# Patient Record
Sex: FEMALE | Race: WHITE | Employment: OTHER | URBAN - METROPOLITAN AREA
[De-identification: names, ages, dates, MRNs, and addresses within clinical notes are randomized per-mention and may not be internally consistent; named-entity substitution may affect disease eponyms.]

---

## 2018-08-13 ENCOUNTER — HOSPITAL ENCOUNTER (OUTPATIENT)
Dept: MAMMOGRAPHY | Age: 78
Discharge: HOME | End: 2018-08-13
Attending: NURSE PRACTITIONER
Payer: MEDICARE

## 2018-08-13 DIAGNOSIS — M81.0 AGE-RELATED OSTEOPOROSIS WITHOUT CURRENT PATHOLOGICAL FRACTURE: ICD-10-CM

## 2018-08-13 PROCEDURE — 77063 BREAST TOMOSYNTHESIS BI: CPT

## 2018-08-13 PROCEDURE — 77080 DXA BONE DENSITY AXIAL: CPT

## 2018-09-10 ENCOUNTER — APPOINTMENT (RX ONLY)
Dept: URBAN - METROPOLITAN AREA CLINIC 24 | Facility: CLINIC | Age: 78
Setting detail: DERMATOLOGY
End: 2018-09-10

## 2018-09-10 DIAGNOSIS — D18.0 HEMANGIOMA: ICD-10-CM

## 2018-09-10 DIAGNOSIS — L82.1 OTHER SEBORRHEIC KERATOSIS: ICD-10-CM

## 2018-09-10 DIAGNOSIS — D22 MELANOCYTIC NEVI: ICD-10-CM

## 2018-09-10 DIAGNOSIS — L21.8 OTHER SEBORRHEIC DERMATITIS: ICD-10-CM

## 2018-09-10 PROBLEM — D18.01 HEMANGIOMA OF SKIN AND SUBCUTANEOUS TISSUE: Status: ACTIVE | Noted: 2018-09-10

## 2018-09-10 PROBLEM — D22.39 MELANOCYTIC NEVI OF OTHER PARTS OF FACE: Status: ACTIVE | Noted: 2018-09-10

## 2018-09-10 PROBLEM — D22.71 MELANOCYTIC NEVI OF RIGHT LOWER LIMB, INCLUDING HIP: Status: ACTIVE | Noted: 2018-09-10

## 2018-09-10 PROBLEM — D23.72 OTHER BENIGN NEOPLASM OF SKIN OF LEFT LOWER LIMB, INCLUDING HIP: Status: ACTIVE | Noted: 2018-09-10

## 2018-09-10 PROCEDURE — ? COUNSELING

## 2018-09-10 PROCEDURE — 99202 OFFICE O/P NEW SF 15 MIN: CPT

## 2018-09-10 ASSESSMENT — LOCATION ZONE DERM
LOCATION ZONE: EAR
LOCATION ZONE: FACE
LOCATION ZONE: ARM
LOCATION ZONE: NECK
LOCATION ZONE: NOSE
LOCATION ZONE: LEG
LOCATION ZONE: TRUNK

## 2018-09-10 ASSESSMENT — LOCATION SIMPLE DESCRIPTION DERM
LOCATION SIMPLE: LEFT FOREARM
LOCATION SIMPLE: LEFT ANTERIOR NECK
LOCATION SIMPLE: NOSE
LOCATION SIMPLE: LEFT THIGH
LOCATION SIMPLE: LEFT CHEEK
LOCATION SIMPLE: LEFT EAR
LOCATION SIMPLE: RIGHT UPPER BACK
LOCATION SIMPLE: RIGHT KNEE
LOCATION SIMPLE: LEFT LOWER BACK
LOCATION SIMPLE: ABDOMEN

## 2018-09-10 ASSESSMENT — LOCATION DETAILED DESCRIPTION DERM
LOCATION DETAILED: LEFT DISTAL DORSAL FOREARM
LOCATION DETAILED: LEFT INFERIOR LATERAL MIDBACK
LOCATION DETAILED: RIGHT SUPERIOR UPPER BACK
LOCATION DETAILED: SUBXIPHOID
LOCATION DETAILED: LEFT NASAL DORSUM
LOCATION DETAILED: LEFT SUPERIOR MEDIAL MALAR CHEEK
LOCATION DETAILED: RIGHT KNEE
LOCATION DETAILED: LEFT INFERIOR LATERAL NECK
LOCATION DETAILED: RIGHT RIB CAGE
LOCATION DETAILED: LEFT ANTERIOR DISTAL THIGH
LOCATION DETAILED: LEFT PROXIMAL DORSAL FOREARM
LOCATION DETAILED: RIGHT MID-UPPER BACK
LOCATION DETAILED: LEFT LATERAL MANDIBULAR CHEEK
LOCATION DETAILED: LEFT CAVUM CONCHA

## 2019-01-09 ENCOUNTER — HOSPITAL ENCOUNTER (OUTPATIENT)
Age: 79
Setting detail: OBSERVATION
Discharge: NURSING, REHAB OR LONG-TERM CARE | DRG: 482 | End: 2019-01-14
Attending: EMERGENCY MEDICINE | Admitting: INTERNAL MEDICINE
Payer: MEDICARE

## 2019-01-09 ENCOUNTER — APPOINTMENT (OUTPATIENT)
Dept: GENERAL RADIOLOGY | Age: 79
DRG: 482 | End: 2019-01-09
Attending: EMERGENCY MEDICINE
Payer: MEDICARE

## 2019-01-09 DIAGNOSIS — S72.001A CLOSED FRACTURE OF RIGHT HIP, INITIAL ENCOUNTER (HCC): Primary | ICD-10-CM

## 2019-01-09 PROBLEM — S72.009A HIP FRACTURE (HCC): Status: ACTIVE | Noted: 2019-01-09

## 2019-01-09 LAB
ALBUMIN SERPL-MCNC: 3.2 G/DL (ref 3.2–4.6)
ALBUMIN/GLOB SERPL: 0.9 {RATIO} (ref 1.2–3.5)
ALP SERPL-CCNC: 96 U/L (ref 50–136)
ALT SERPL-CCNC: 26 U/L (ref 12–65)
ANION GAP SERPL CALC-SCNC: 4 MMOL/L (ref 7–16)
APTT PPP: 27.4 SEC (ref 24.7–39.8)
AST SERPL-CCNC: 20 U/L (ref 15–37)
BASOPHILS # BLD: 0.1 K/UL (ref 0–0.2)
BASOPHILS NFR BLD: 0 % (ref 0–2)
BILIRUB SERPL-MCNC: 0.3 MG/DL (ref 0.2–1.1)
BUN SERPL-MCNC: 22 MG/DL (ref 8–23)
CALCIUM SERPL-MCNC: 9.1 MG/DL (ref 8.3–10.4)
CHLORIDE SERPL-SCNC: 106 MMOL/L (ref 98–107)
CO2 SERPL-SCNC: 30 MMOL/L (ref 21–32)
CREAT SERPL-MCNC: 0.83 MG/DL (ref 0.6–1)
DIFFERENTIAL METHOD BLD: ABNORMAL
EOSINOPHIL # BLD: 0.1 K/UL (ref 0–0.8)
EOSINOPHIL NFR BLD: 1 % (ref 0.5–7.8)
ERYTHROCYTE [DISTWIDTH] IN BLOOD BY AUTOMATED COUNT: 14.2 % (ref 11.9–14.6)
GLOBULIN SER CALC-MCNC: 3.7 G/DL (ref 2.3–3.5)
GLUCOSE SERPL-MCNC: 124 MG/DL (ref 65–100)
HCT VFR BLD AUTO: 39 % (ref 35.8–46.3)
HGB BLD-MCNC: 12.5 G/DL (ref 11.7–15.4)
IMM GRANULOCYTES # BLD AUTO: 0.1 K/UL (ref 0–0.5)
IMM GRANULOCYTES NFR BLD AUTO: 1 % (ref 0–5)
INR PPP: 1
LYMPHOCYTES # BLD: 2.8 K/UL (ref 0.5–4.6)
LYMPHOCYTES NFR BLD: 18 % (ref 13–44)
MCH RBC QN AUTO: 29.3 PG (ref 26.1–32.9)
MCHC RBC AUTO-ENTMCNC: 32.1 G/DL (ref 31.4–35)
MCV RBC AUTO: 91.5 FL (ref 79.6–97.8)
MONOCYTES # BLD: 0.8 K/UL (ref 0.1–1.3)
MONOCYTES NFR BLD: 5 % (ref 4–12)
NEUTS SEG # BLD: 11.5 K/UL (ref 1.7–8.2)
NEUTS SEG NFR BLD: 75 % (ref 43–78)
NRBC # BLD: 0 K/UL (ref 0–0.2)
PLATELET # BLD AUTO: 253 K/UL (ref 150–450)
PMV BLD AUTO: 10.5 FL (ref 9.4–12.3)
POTASSIUM SERPL-SCNC: 4.1 MMOL/L (ref 3.5–5.1)
PROT SERPL-MCNC: 6.9 G/DL (ref 6.3–8.2)
PROTHROMBIN TIME: 13.5 SEC (ref 11.7–14.5)
RBC # BLD AUTO: 4.26 M/UL (ref 4.05–5.2)
SODIUM SERPL-SCNC: 140 MMOL/L (ref 136–145)
WBC # BLD AUTO: 15.5 K/UL (ref 4.3–11.1)

## 2019-01-09 PROCEDURE — 77030005518 HC CATH URETH FOL 2W BARD -B

## 2019-01-09 PROCEDURE — 73552 X-RAY EXAM OF FEMUR 2/>: CPT

## 2019-01-09 PROCEDURE — 0T9B70Z DRAINAGE OF BLADDER WITH DRAINAGE DEVICE, VIA NATURAL OR ARTIFICIAL OPENING: ICD-10-PCS | Performed by: INTERNAL MEDICINE

## 2019-01-09 PROCEDURE — 73502 X-RAY EXAM HIP UNI 2-3 VIEWS: CPT

## 2019-01-09 PROCEDURE — 83970 ASSAY OF PARATHORMONE: CPT

## 2019-01-09 PROCEDURE — 85610 PROTHROMBIN TIME: CPT

## 2019-01-09 PROCEDURE — 51702 INSERT TEMP BLADDER CATH: CPT | Performed by: EMERGENCY MEDICINE

## 2019-01-09 PROCEDURE — 82306 VITAMIN D 25 HYDROXY: CPT

## 2019-01-09 PROCEDURE — 86900 BLOOD TYPING SEROLOGIC ABO: CPT

## 2019-01-09 PROCEDURE — 71045 X-RAY EXAM CHEST 1 VIEW: CPT

## 2019-01-09 PROCEDURE — 93005 ELECTROCARDIOGRAM TRACING: CPT | Performed by: EMERGENCY MEDICINE

## 2019-01-09 PROCEDURE — 0QS606Z REPOSITION RIGHT UPPER FEMUR WITH INTRAMEDULLARY INTERNAL FIXATION DEVICE, OPEN APPROACH: ICD-10-PCS | Performed by: ORTHOPAEDIC SURGERY

## 2019-01-09 PROCEDURE — 99285 EMERGENCY DEPT VISIT HI MDM: CPT | Performed by: EMERGENCY MEDICINE

## 2019-01-09 PROCEDURE — 80053 COMPREHEN METABOLIC PANEL: CPT

## 2019-01-09 PROCEDURE — 85730 THROMBOPLASTIN TIME PARTIAL: CPT

## 2019-01-09 PROCEDURE — 85025 COMPLETE CBC W/AUTO DIFF WBC: CPT

## 2019-01-09 PROCEDURE — 84134 ASSAY OF PREALBUMIN: CPT

## 2019-01-09 RX ORDER — MORPHINE SULFATE 2 MG/ML
4 INJECTION, SOLUTION INTRAMUSCULAR; INTRAVENOUS ONCE
Status: DISCONTINUED | OUTPATIENT
Start: 2019-01-09 | End: 2019-01-09

## 2019-01-10 ENCOUNTER — APPOINTMENT (OUTPATIENT)
Dept: GENERAL RADIOLOGY | Age: 79
DRG: 482 | End: 2019-01-10
Attending: ORTHOPAEDIC SURGERY
Payer: MEDICARE

## 2019-01-10 ENCOUNTER — ANESTHESIA (OUTPATIENT)
Dept: SURGERY | Age: 79
DRG: 482 | End: 2019-01-10
Payer: MEDICARE

## 2019-01-10 ENCOUNTER — ANESTHESIA EVENT (OUTPATIENT)
Dept: SURGERY | Age: 79
DRG: 482 | End: 2019-01-10
Payer: MEDICARE

## 2019-01-10 LAB
ABO + RH BLD: NORMAL
APPEARANCE UR: CLEAR
ATRIAL RATE: 86 BPM
BACTERIA SPEC CULT: NORMAL
BILIRUB UR QL: NEGATIVE
BLOOD GROUP ANTIBODIES SERPL: NORMAL
CALCIUM SERPL-MCNC: 9 MG/DL (ref 8.3–10.4)
CALCULATED P AXIS, ECG09: 67 DEGREES
CALCULATED R AXIS, ECG10: 19 DEGREES
CALCULATED T AXIS, ECG11: 80 DEGREES
COLOR UR: YELLOW
DIAGNOSIS, 93000: NORMAL
GLUCOSE UR STRIP.AUTO-MCNC: NEGATIVE MG/DL
HGB UR QL STRIP: NEGATIVE
KETONES UR QL STRIP.AUTO: 15 MG/DL
LEUKOCYTE ESTERASE UR QL STRIP.AUTO: NEGATIVE
NITRITE UR QL STRIP.AUTO: NEGATIVE
P-R INTERVAL, ECG05: 182 MS
PH UR STRIP: 6.5 [PH] (ref 5–9)
PREALB SERPL-MCNC: 23.1 MG/DL (ref 18–35.7)
PROT UR STRIP-MCNC: NEGATIVE MG/DL
PTH-INTACT SERPL-MCNC: 59.2 PG/ML (ref 18.5–88)
Q-T INTERVAL, ECG07: 358 MS
QRS DURATION, ECG06: 78 MS
QTC CALCULATION (BEZET), ECG08: 428 MS
SERVICE CMNT-IMP: NORMAL
SP GR UR REFRACTOMETRY: 1.02 (ref 1–1.02)
SPECIMEN EXP DATE BLD: NORMAL
UROBILINOGEN UR QL STRIP.AUTO: 0.2 EU/DL (ref 0.2–1)
VENTRICULAR RATE, ECG03: 86 BPM

## 2019-01-10 PROCEDURE — 97161 PT EVAL LOW COMPLEX 20 MIN: CPT

## 2019-01-10 PROCEDURE — 99218 HC RM OBSERVATION: CPT

## 2019-01-10 PROCEDURE — 87641 MR-STAPH DNA AMP PROBE: CPT

## 2019-01-10 PROCEDURE — 74011250637 HC RX REV CODE- 250/637: Performed by: NURSE PRACTITIONER

## 2019-01-10 PROCEDURE — 77030002933 HC SUT MCRYL J&J -A: Performed by: ORTHOPAEDIC SURGERY

## 2019-01-10 PROCEDURE — 36415 COLL VENOUS BLD VENIPUNCTURE: CPT

## 2019-01-10 PROCEDURE — 76010000161 HC OR TIME 1 TO 1.5 HR INTENSV-TIER 1: Performed by: ORTHOPAEDIC SURGERY

## 2019-01-10 PROCEDURE — 77030014405 HC GD ROD RMR SYNT -C: Performed by: ORTHOPAEDIC SURGERY

## 2019-01-10 PROCEDURE — 77030021907 HC KT URIN FOL O&M -A

## 2019-01-10 PROCEDURE — 74011250636 HC RX REV CODE- 250/636: Performed by: ANESTHESIOLOGY

## 2019-01-10 PROCEDURE — 74011250637 HC RX REV CODE- 250/637: Performed by: INTERNAL MEDICINE

## 2019-01-10 PROCEDURE — 74011250636 HC RX REV CODE- 250/636

## 2019-01-10 PROCEDURE — 74011250636 HC RX REV CODE- 250/636: Performed by: ORTHOPAEDIC SURGERY

## 2019-01-10 PROCEDURE — 74011250636 HC RX REV CODE- 250/636: Performed by: INTERNAL MEDICINE

## 2019-01-10 PROCEDURE — 77030020255 HC SOL INJ LR 1000ML BG

## 2019-01-10 PROCEDURE — 76060000033 HC ANESTHESIA 1 TO 1.5 HR: Performed by: ORTHOPAEDIC SURGERY

## 2019-01-10 PROCEDURE — 81003 URINALYSIS AUTO W/O SCOPE: CPT

## 2019-01-10 PROCEDURE — 74011250636 HC RX REV CODE- 250/636: Performed by: NURSE PRACTITIONER

## 2019-01-10 PROCEDURE — 73552 X-RAY EXAM OF FEMUR 2/>: CPT

## 2019-01-10 PROCEDURE — 77030008467 HC STPLR SKN COVD -B: Performed by: ORTHOPAEDIC SURGERY

## 2019-01-10 PROCEDURE — C1713 ANCHOR/SCREW BN/BN,TIS/BN: HCPCS | Performed by: ORTHOPAEDIC SURGERY

## 2019-01-10 PROCEDURE — 77030020263 HC SOL INJ SOD CL0.9% LFCR 1000ML

## 2019-01-10 PROCEDURE — C1769 GUIDE WIRE: HCPCS | Performed by: ORTHOPAEDIC SURGERY

## 2019-01-10 PROCEDURE — 97530 THERAPEUTIC ACTIVITIES: CPT

## 2019-01-10 PROCEDURE — 77030018836 HC SOL IRR NACL ICUM -A: Performed by: ORTHOPAEDIC SURGERY

## 2019-01-10 PROCEDURE — 77030027138 HC INCENT SPIROMETER -A

## 2019-01-10 PROCEDURE — 74011000302 HC RX REV CODE- 302: Performed by: INTERNAL MEDICINE

## 2019-01-10 PROCEDURE — 65270000029 HC RM PRIVATE

## 2019-01-10 PROCEDURE — 76210000006 HC OR PH I REC 0.5 TO 1 HR: Performed by: ORTHOPAEDIC SURGERY

## 2019-01-10 PROCEDURE — 74011000250 HC RX REV CODE- 250

## 2019-01-10 PROCEDURE — 77030019940 HC BLNKT HYPOTHRM STRY -B: Performed by: ANESTHESIOLOGY

## 2019-01-10 PROCEDURE — 74011000258 HC RX REV CODE- 258: Performed by: NURSE PRACTITIONER

## 2019-01-10 PROCEDURE — 77030007880 HC KT SPN EPDRL BBMI -B: Performed by: ANESTHESIOLOGY

## 2019-01-10 PROCEDURE — 77030003665 HC NDL SPN BBMI -A: Performed by: ANESTHESIOLOGY

## 2019-01-10 PROCEDURE — 86580 TB INTRADERMAL TEST: CPT | Performed by: INTERNAL MEDICINE

## 2019-01-10 DEVICE — IMPLANTABLE DEVICE: Type: IMPLANTABLE DEVICE | Site: HIP | Status: FUNCTIONAL

## 2019-01-10 RX ORDER — OXYCODONE HYDROCHLORIDE 5 MG/1
5 TABLET ORAL
Status: DISCONTINUED | OUTPATIENT
Start: 2019-01-10 | End: 2019-01-14 | Stop reason: HOSPADM

## 2019-01-10 RX ORDER — ENOXAPARIN SODIUM 100 MG/ML
30 INJECTION SUBCUTANEOUS EVERY 24 HOURS
Status: DISCONTINUED | OUTPATIENT
Start: 2019-01-11 | End: 2019-01-10

## 2019-01-10 RX ORDER — OXYCODONE AND ACETAMINOPHEN 10; 325 MG/1; MG/1
1 TABLET ORAL AS NEEDED
Status: DISCONTINUED | OUTPATIENT
Start: 2019-01-10 | End: 2019-01-10 | Stop reason: HOSPADM

## 2019-01-10 RX ORDER — BUPIVACAINE HYDROCHLORIDE 7.5 MG/ML
INJECTION, SOLUTION INTRASPINAL
Status: COMPLETED | OUTPATIENT
Start: 2019-01-10 | End: 2019-01-10

## 2019-01-10 RX ORDER — SODIUM CHLORIDE 0.9 % (FLUSH) 0.9 %
5-40 SYRINGE (ML) INJECTION AS NEEDED
Status: DISCONTINUED | OUTPATIENT
Start: 2019-01-10 | End: 2019-01-10 | Stop reason: HOSPADM

## 2019-01-10 RX ORDER — ACETAMINOPHEN 325 MG/1
650 TABLET ORAL EVERY 8 HOURS
Status: DISCONTINUED | OUTPATIENT
Start: 2019-01-10 | End: 2019-01-10 | Stop reason: HOSPADM

## 2019-01-10 RX ORDER — TRAMADOL HYDROCHLORIDE 50 MG/1
50 TABLET ORAL
Status: DISCONTINUED | OUTPATIENT
Start: 2019-01-10 | End: 2019-01-14 | Stop reason: HOSPADM

## 2019-01-10 RX ORDER — HYDROMORPHONE HYDROCHLORIDE 2 MG/ML
0.5 INJECTION, SOLUTION INTRAMUSCULAR; INTRAVENOUS; SUBCUTANEOUS
Status: DISCONTINUED | OUTPATIENT
Start: 2019-01-10 | End: 2019-01-10 | Stop reason: HOSPADM

## 2019-01-10 RX ORDER — SODIUM CHLORIDE 0.9 % (FLUSH) 0.9 %
5-40 SYRINGE (ML) INJECTION EVERY 8 HOURS
Status: DISCONTINUED | OUTPATIENT
Start: 2019-01-10 | End: 2019-01-10 | Stop reason: HOSPADM

## 2019-01-10 RX ORDER — OXYCODONE HYDROCHLORIDE 5 MG/1
5 TABLET ORAL
Status: DISCONTINUED | OUTPATIENT
Start: 2019-01-10 | End: 2019-01-10 | Stop reason: HOSPADM

## 2019-01-10 RX ORDER — BUPIVACAINE HYDROCHLORIDE 7.5 MG/ML
INJECTION, SOLUTION INTRASPINAL AS NEEDED
Status: DISCONTINUED | OUTPATIENT
Start: 2019-01-10 | End: 2019-01-10 | Stop reason: HOSPADM

## 2019-01-10 RX ORDER — SODIUM CHLORIDE 9 MG/ML
75 INJECTION, SOLUTION INTRAVENOUS CONTINUOUS
Status: DISCONTINUED | OUTPATIENT
Start: 2019-01-10 | End: 2019-01-10 | Stop reason: HOSPADM

## 2019-01-10 RX ORDER — PROPOFOL 10 MG/ML
INJECTION, EMULSION INTRAVENOUS
Status: DISCONTINUED | OUTPATIENT
Start: 2019-01-10 | End: 2019-01-10 | Stop reason: HOSPADM

## 2019-01-10 RX ORDER — PROPOFOL 10 MG/ML
INJECTION, EMULSION INTRAVENOUS AS NEEDED
Status: DISCONTINUED | OUTPATIENT
Start: 2019-01-10 | End: 2019-01-10 | Stop reason: HOSPADM

## 2019-01-10 RX ORDER — CEFAZOLIN SODIUM/WATER 2 G/20 ML
2 SYRINGE (ML) INTRAVENOUS
Status: COMPLETED | OUTPATIENT
Start: 2019-01-10 | End: 2019-01-10

## 2019-01-10 RX ORDER — HYDROCODONE BITARTRATE AND ACETAMINOPHEN 7.5; 325 MG/1; MG/1
1 TABLET ORAL
Status: DISCONTINUED | OUTPATIENT
Start: 2019-01-10 | End: 2019-01-10

## 2019-01-10 RX ORDER — SODIUM CHLORIDE, SODIUM LACTATE, POTASSIUM CHLORIDE, CALCIUM CHLORIDE 600; 310; 30; 20 MG/100ML; MG/100ML; MG/100ML; MG/100ML
75 INJECTION, SOLUTION INTRAVENOUS CONTINUOUS
Status: DISCONTINUED | OUTPATIENT
Start: 2019-01-10 | End: 2019-01-10 | Stop reason: HOSPADM

## 2019-01-10 RX ORDER — MAG HYDROX/ALUMINUM HYD/SIMETH 200-200-20
30 SUSPENSION, ORAL (FINAL DOSE FORM) ORAL
Status: DISCONTINUED | OUTPATIENT
Start: 2019-01-10 | End: 2019-01-14 | Stop reason: HOSPADM

## 2019-01-10 RX ORDER — DOCUSATE SODIUM 100 MG/1
100 CAPSULE, LIQUID FILLED ORAL 2 TIMES DAILY
Status: DISCONTINUED | OUTPATIENT
Start: 2019-01-10 | End: 2019-01-14 | Stop reason: HOSPADM

## 2019-01-10 RX ORDER — SODIUM CHLORIDE 0.9 % (FLUSH) 0.9 %
5-40 SYRINGE (ML) INJECTION EVERY 8 HOURS
Status: DISCONTINUED | OUTPATIENT
Start: 2019-01-10 | End: 2019-01-14 | Stop reason: HOSPADM

## 2019-01-10 RX ORDER — FERROUS SULFATE, DRIED 160(50) MG
1 TABLET, EXTENDED RELEASE ORAL
Status: DISCONTINUED | OUTPATIENT
Start: 2019-01-10 | End: 2019-01-14 | Stop reason: HOSPADM

## 2019-01-10 RX ORDER — SODIUM CHLORIDE, SODIUM LACTATE, POTASSIUM CHLORIDE, CALCIUM CHLORIDE 600; 310; 30; 20 MG/100ML; MG/100ML; MG/100ML; MG/100ML
75 INJECTION, SOLUTION INTRAVENOUS CONTINUOUS
Status: DISCONTINUED | OUTPATIENT
Start: 2019-01-10 | End: 2019-01-14 | Stop reason: HOSPADM

## 2019-01-10 RX ORDER — ACETAMINOPHEN 325 MG/1
650 TABLET ORAL EVERY 8 HOURS
Status: DISCONTINUED | OUTPATIENT
Start: 2019-01-10 | End: 2019-01-14 | Stop reason: HOSPADM

## 2019-01-10 RX ORDER — SODIUM CHLORIDE 0.9 % (FLUSH) 0.9 %
5-40 SYRINGE (ML) INJECTION AS NEEDED
Status: DISCONTINUED | OUTPATIENT
Start: 2019-01-10 | End: 2019-01-14 | Stop reason: HOSPADM

## 2019-01-10 RX ORDER — ENOXAPARIN SODIUM 100 MG/ML
40 INJECTION SUBCUTANEOUS EVERY 24 HOURS
Status: DISCONTINUED | OUTPATIENT
Start: 2019-01-11 | End: 2019-01-14 | Stop reason: HOSPADM

## 2019-01-10 RX ORDER — HYDROMORPHONE HYDROCHLORIDE 1 MG/ML
0.5 INJECTION, SOLUTION INTRAMUSCULAR; INTRAVENOUS; SUBCUTANEOUS
Status: DISCONTINUED | OUTPATIENT
Start: 2019-01-10 | End: 2019-01-10 | Stop reason: HOSPADM

## 2019-01-10 RX ORDER — ONDANSETRON 2 MG/ML
4 INJECTION INTRAMUSCULAR; INTRAVENOUS
Status: DISCONTINUED | OUTPATIENT
Start: 2019-01-10 | End: 2019-01-14 | Stop reason: HOSPADM

## 2019-01-10 RX ORDER — OXYCODONE HYDROCHLORIDE 5 MG/1
5 TABLET ORAL
Status: DISCONTINUED | OUTPATIENT
Start: 2019-01-10 | End: 2019-01-10

## 2019-01-10 RX ADMIN — TUBERCULIN PURIFIED PROTEIN DERIVATIVE 5 UNITS: 5 INJECTION INTRADERMAL at 01:28

## 2019-01-10 RX ADMIN — Medication 10 ML: at 20:41

## 2019-01-10 RX ADMIN — DOCUSATE SODIUM 100 MG: 100 CAPSULE, LIQUID FILLED ORAL at 19:16

## 2019-01-10 RX ADMIN — OXYCODONE HYDROCHLORIDE 5 MG: 5 TABLET ORAL at 15:21

## 2019-01-10 RX ADMIN — ACETAMINOPHEN 650 MG: 325 TABLET ORAL at 20:40

## 2019-01-10 RX ADMIN — SODIUM CHLORIDE 1000 MG: 900 INJECTION, SOLUTION INTRAVENOUS at 20:41

## 2019-01-10 RX ADMIN — Medication 10 ML: at 05:48

## 2019-01-10 RX ADMIN — SODIUM CHLORIDE, SODIUM LACTATE, POTASSIUM CHLORIDE, AND CALCIUM CHLORIDE 75 ML/HR: 600; 310; 30; 20 INJECTION, SOLUTION INTRAVENOUS at 11:27

## 2019-01-10 RX ADMIN — ACETAMINOPHEN 650 MG: 325 TABLET ORAL at 05:48

## 2019-01-10 RX ADMIN — SODIUM CHLORIDE 250 ML: 900 INJECTION, SOLUTION INTRAVENOUS at 05:49

## 2019-01-10 RX ADMIN — SODIUM CHLORIDE, SODIUM LACTATE, POTASSIUM CHLORIDE, AND CALCIUM CHLORIDE 75 ML/HR: 600; 310; 30; 20 INJECTION, SOLUTION INTRAVENOUS at 15:21

## 2019-01-10 RX ADMIN — Medication 5 ML: at 15:21

## 2019-01-10 RX ADMIN — PROPOFOL 50 MG: 10 INJECTION, EMULSION INTRAVENOUS at 12:52

## 2019-01-10 RX ADMIN — PROPOFOL 50 MCG/KG/MIN: 10 INJECTION, EMULSION INTRAVENOUS at 12:54

## 2019-01-10 RX ADMIN — OXYCODONE HYDROCHLORIDE 5 MG: 5 TABLET ORAL at 20:40

## 2019-01-10 RX ADMIN — HYDROCODONE BITARTRATE AND ACETAMINOPHEN 1 TABLET: 7.5; 325 TABLET ORAL at 02:00

## 2019-01-10 RX ADMIN — SODIUM CHLORIDE 250 ML: 900 INJECTION, SOLUTION INTRAVENOUS at 01:30

## 2019-01-10 RX ADMIN — BUPIVACAINE HYDROCHLORIDE 1 ML: 7.5 INJECTION, SOLUTION INTRASPINAL at 12:53

## 2019-01-10 RX ADMIN — CALCIUM CARBONATE 500 MG (1,250 MG)-VITAMIN D3 200 UNIT TABLET 1 TABLET: at 15:19

## 2019-01-10 RX ADMIN — ACETAMINOPHEN 650 MG: 325 TABLET ORAL at 02:00

## 2019-01-10 RX ADMIN — ACETAMINOPHEN 650 MG: 325 TABLET ORAL at 15:18

## 2019-01-10 RX ADMIN — BUPIVACAINE HYDROCHLORIDE 7.5 MG: 7.5 INJECTION, SOLUTION INTRASPINAL at 12:55

## 2019-01-10 RX ADMIN — Medication 5 ML: at 02:00

## 2019-01-10 RX ADMIN — Medication 2 G: at 13:05

## 2019-01-10 NOTE — ED TRIAGE NOTES
Patient arrives via EMS from [facility]. Ems states that she tripped and fell. EMS states no head injury or LOC. Patient complaining of right hip pain. EMS placed patient in traction splint and gave 10mg morphine.

## 2019-01-10 NOTE — ANESTHESIA PREPROCEDURE EVALUATION
Anesthetic History No history of anesthetic complications Review of Systems / Medical History Patient summary reviewed and pertinent labs reviewed Pulmonary Within defined limits Neuro/Psych Within defined limits Cardiovascular Exercise tolerance: >4 METS 
  
GI/Hepatic/Renal 
Within defined limits Endo/Other Within defined limits Other Findings Physical Exam 
 
Airway Mallampati: II 
TM Distance: > 6 cm Neck ROM: normal range of motion Mouth opening: Normal 
 
 Cardiovascular Rhythm: regular Dental 
No notable dental hx Pulmonary Abdominal 
GI exam deferred Other Findings Anesthetic Plan ASA: 2 Anesthesia type: spinal 
 
 
 
 
Induction: Intravenous Anesthetic plan and risks discussed with: Patient

## 2019-01-10 NOTE — ED PROVIDER NOTES
68-year-old female presenting for right hip pain. She tripped and fell at [employer]. She was unable to get up. She is lost her balance walking on uneven pavement and fell on her right side. She does have a history of mild osteoporosis treated with calcium. She denies back or neck pain. She did not hit her head or pass out. She's had no chest pain or palpitations. The history is provided by the patient. Fall The accident occurred less than 1 hour ago. The fall occurred while walking. She fell from a height of 1 - 2 ft. She landed on hard floor. There was no blood loss. The point of impact was the right hip. The pain is present in the right hip. The pain is at a severity of 6/10. The pain is moderate. She was not ambulatory at the scene. There was no entrapment after the fall. There was no drug use involved in the accident. There was no alcohol use involved in the accident. Pertinent negatives include no visual change, no fever, no numbness, no abdominal pain, no bowel incontinence, no nausea, no vomiting, no hematuria, no headaches, no extremity weakness, no hearing loss, no loss of consciousness, no tingling and no laceration. The risk factors include none. The symptoms are aggravated by use of injured limb. Prehospitalization: traction splint. She has tried immobilization for the symptoms. The treatment provided moderate relief. No past medical history on file. No past surgical history on file. No family history on file. Social History Socioeconomic History  Marital status:  Spouse name: Not on file  Number of children: Not on file  Years of education: Not on file  Highest education level: Not on file Social Needs  Financial resource strain: Not on file  Food insecurity - worry: Not on file  Food insecurity - inability: Not on file  Transportation needs - medical: Not on file  Transportation needs - non-medical: Not on file Occupational History  Not on file Tobacco Use  Smoking status: Not on file Substance and Sexual Activity  Alcohol use: Not on file  Drug use: Not on file  Sexual activity: Not on file Other Topics Concern  Not on file Social History Narrative  Not on file ALLERGIES: Patient has no known allergies. Review of Systems Constitutional: Negative for fever. Gastrointestinal: Negative for abdominal pain, bowel incontinence, nausea and vomiting. Genitourinary: Negative for hematuria. Musculoskeletal: Negative for extremity weakness. Neurological: Negative for tingling, loss of consciousness, numbness and headaches. All other systems reviewed and are negative. Vitals:  
 01/09/19 2023 BP: 188/84 Pulse: 86 Resp: 14 Temp: 99 °F (37.2 °C) SpO2: 91% Weight: 77.1 kg (170 lb) Height: 5' 5\" (1.651 m) Physical Exam  
Constitutional: She is oriented to person, place, and time. She appears well-developed and well-nourished. HENT:  
Head: Normocephalic and atraumatic. Eyes: Conjunctivae are normal. Pupils are equal, round, and reactive to light. Neck: Neck supple. Cardiovascular: Normal rate and regular rhythm. Pulmonary/Chest: Effort normal and breath sounds normal.  
Abdominal: Soft. Bowel sounds are normal.  
Musculoskeletal:  
Tenderness to palpation over the right greater trochanter, leg is in a traction splint, he does appear somewhat shorter than the left, pulses are 2+ bilaterally in the color of the foot is normal  
Neurological: She is alert and oriented to person, place, and time. Skin: Skin is warm and dry. No laceration noted. Nursing note and vitals reviewed. MDM Number of Diagnoses or Management Options Closed fracture of right hip, initial encounter [facility]):  
Diagnosis management comments: 75-year-old female presenting for right hip pain after fall. Concern for hip fracture. Amount and/or Complexity of Data Reviewed Clinical lab tests: ordered and reviewed (No significant abnormality) Tests in the radiology section of CPT®: ordered and reviewed Tests in the medicine section of CPT®: ordered and reviewed Discuss the patient with other providers: yes (Consulted the orthopedist and the hospitalist who will assume care of this patient) Independent visualization of images, tracings, or specimens: yes (I reviewed the patient's chest x-ray and hip x-rays) Risk of Complications, Morbidity, and/or Mortality Presenting problems: moderate Management options: high Patient Progress Patient progress: improved ED Course as of Jan 09 2202 Wed Jan 09, 2019 2059 Consulting Dr. Justine Charles PA for hip fracture management  [JS] 2103 I reviewed the patient's hip x-ray and she appears to have a right-sided intratrochanteric hip fracture. [JS] 2111 Room 734  [JS]  
2128 Preoperative EKG was performed and shows a normal sinus rhythm rate 86, normal axis, DC is 182, QRS 78, QTC is 428 with no acute ischemic changes  [JS] ED Course User Index [JS] Catalino Headley MD  
 
 
Procedures

## 2019-01-10 NOTE — PROGRESS NOTES
TRANSFER - IN REPORT: 
 
Verbal report received from Rojelio Thomas RN on [employer]  being received from ER for routine progression of care Report consisted of patients Situation, Background, Assessment and  
Recommendations(SBAR). Information from the following report(s) SBAR and ED Summary was reviewed with the receiving nurse. Opportunity for questions and clarification was provided. Assessment completed upon patients arrival to unit and care assumed.

## 2019-01-10 NOTE — PROGRESS NOTES
Spiritual Care Visit. Initial visit. Patient was visited by [place]o Cuevas. Entered by Zachariah Anne, Staff .  Cindi, Vani.

## 2019-01-10 NOTE — PROGRESS NOTES
TRANSFER - IN REPORT: 
 
Verbal report received from [place], Rn(name) on [address]  being received from PACU(unit) for routine progression of care Report consisted of patients Situation, Background, Assessment and  
Recommendations(SBAR). Information from the following report(s) SBAR was reviewed with the receiving nurse. Opportunity for questions and clarification was provided. Assessment completed upon patients arrival to unit and care assumed.

## 2019-01-10 NOTE — OP NOTES
[facility] REPORT    Rosalio Laura  MR#: [id number]  : 1940  ACCOUNT #: [de-identified]   DATE OF SERVICE: 01/10/2019    PREOPERATIVE DIAGNOSIS:  Right intertrochanteric hip fracture. POSTOPERATIVE DIAGNOSIS:  Right intertrochanteric hip fracture. PROCEDURE PERFORMED:  ORIF right intertrochanteric hip fracture with IM nail. SURGEON:  Tracy Antunez MD    ASSISTANT:  NONE    ANESTHESIA:  Spinal.    SPECIMENS REMOVED:  NONE    IMPLANTS:  SEE BRIEF OP NOTE    PROCEDURE:  The patient was brought to the operative suite, placed in supine position. After adequate anesthesia was achieved in the form of spinal, the patient was placed upon the fracture table. Perineal posts and foot holes were well padded. Right hip underwent reduction with longitudinal traction, adduction and internal rotation. The hip was then prepped and draped in the usual sterile fashion. A 3 cm incision was made over the tip of the greater trochanter. Hemostasis achieved with Bovie cautery. Guidewire for a Synthes trochanteric fixation nail advance was inserted through the tip of the trochanter across the fracture site into the canal of the femur. Its position was confirmed by AP and lateral fluoroscopic images. The proximal reamer was then passed by hand over this guidewire to open up the proximal portal.  These were removed. Then, a ball tip guidewire was inserted through this portal down to the epiphyseal scar of the knee. Depth gauge was used to determine the appropriate length nail, 11 mm reamer was passed by hand, acted as a sound. The 11 was a little tight, so we reamed the 11, 11.5 and 12 over the guidewire and then chose a Synthes trochanteric fixation nail advanced 420 mm x 10 mm x 130 degree right. It was passed over the ball-tipped guidewire without difficulty. Ball tip guidewire was removed.   Targeting guide was inserted through a stab wound in the lateral aspect of the proximal femur. Guidewire was passed through this targeting guide up into the femoral head. It was center-center on the AP and lateral fluoroscopic images. Depth gauge was used to determine the appropriate length helical blade. Reamers were then passed over the guidewire to open up the lateral cortex neck and head. The Synthes 100 mm fenestrated helical blade was then passed over the guidewire with excellent purchase. The set screw was passed to a dynamic position from above with the torque wrench and the fracture was compressed using the compression device. At this point, the targeting guide was removed. AP and lateral fluoroscopic images confirmed the fracture was reduced anatomically and hardware was in good position. The wounds were irrigated with normal saline and closed in layers. Sterile compressive dressing was applied. The patient was then removed from the fracture table, transferred to the recovery room, alert and oriented under the care of anesthesia. ESTIMATED BLOOD LOSS:  100 mL. FLUIDS:  See Anesthesia record. CLOSURE:  Primary. COMPLICATIONS:  None. MD Dennise Armas / Audra Paz  D: 01/10/2019 13:40     T: 01/10/2019 13:55  JOB #: [id number]

## 2019-01-10 NOTE — ANESTHESIA POSTPROCEDURE EVALUATION
Procedure(s): RIGHT FEMUR INSERTION INTRA MEDULLARY NAIL WITH IM NAIL. Anesthesia Post Evaluation Multimodal analgesia: multimodal analgesia not used between 6 hours prior to anesthesia start to PACU discharge Patient location during evaluation: PACU Patient participation: complete - patient participated Level of consciousness: awake Pain management: adequate Airway patency: patent Anesthetic complications: no 
Respiratory status: acceptable Hydration status: acceptable Post anesthesia nausea and vomiting:  none Visit Vitals BP (P) 148/75 (BP 1 Location: Left arm, BP Patient Position: At rest;Head of bed elevated (Comment degrees)) Pulse 62 Temp (P) 36.5 °C (97.7 °F) Resp 16 Ht 5' 5\" (1.651 m) Wt 77.1 kg (170 lb) SpO2 94% BMI 28.29 kg/m²

## 2019-01-10 NOTE — PROGRESS NOTES
Spoke with patient and  Ramon Negron regarding discharge planning Alert and oriented x 4 Lives with  in own one story home with no step entry  and son Car Bhatia provide all of her support and assistance if needed Prior to admission independent with ADL's and driving. PCP- Dr. Sae Gomes and next appointment March 13th 2019 DME- cane (which she uses when she is gardening due to steep slope) and WC Denies any previous [facility] services Denies any problems affording Rx Surgery scheduled for today. CM discussed STR upon discharge with patient and  and they are agreeable. List of STR given to patient and  to review and choose. CM will continue to follow. Care Management Interventions PCP Verified by CM: Yes Mode of Transport at Discharge: BLS Transition of Care Consult (CM Consult): Discharge Planning Physical Therapy Consult: Yes Current Support Network: Own Home, Lives with Spouse Confirm Follow Up Transport: Family Plan discussed with Pt/Family/Caregiver: Yes Freedom of Choice Offered: Yes Discharge Location Discharge Placement: Rehab Unit Subacute

## 2019-01-10 NOTE — CONSULTS
ORTHO:      PATIENT SCHEDULED FOR SURGERY WITH DR Tigre Tamayo Thursday 1/10/19 @ 12:30 PM - OPEN REDUCTION INTERNAL FIXATION OF RIGHT HIP

## 2019-01-10 NOTE — H&P
HOSPITALIST H&P/CONSULTNAME:  Kedar Avilez Age:  66 y.o. 
:   1940 MRN:   [id number] PCP: Dia Thornton MD 
Consulting MD: Treatment Team: Attending Provider: Amy Felix MD; Primary Nurse: Gwyn Hernandez RN 
HPI:  
Pt is a 66 y.o. female with no PMH who presents after fall with hip pain. Pt experienced a ground level fall earlier today with immediate pain to R hip. She was unable to stand. She did not hit her head, no LOC. In ED, VSS, afebrile. Imaging reveal R intercondylar fracture. Ortho planning for OR tomorrow. Complete ROS done and is as stated in HPI or otherwise negative No past medical history on file. No past surgical history on file. None No Known Allergies Social History Tobacco Use  Smoking status: Not on file Substance Use Topics  Alcohol use: Not on file Family history reviewed and not pertinent. Objective:  
 
Patient Vitals for the past 24 hrs: 
 Temp Pulse Resp BP SpO2  
19  85  (!) 168/119 100 % 19  85   92 % 19 99 °F (37.2 °C) 86 14 188/84 90 % Oxygen Therapy O2 Sat (%): 100 % (19) Pulse via Oximetry: 86 beats per minute (19) O2 Device: Room air (19) Physical Exam: 
General:    Alert, cooperative, no distress, appears stated age. Head:   Normocephalic, without obvious abnormality, atraumatic. Nose:  Nares normal. No drainage or sinus tenderness. Lungs:   Clear to auscultation bilaterally. No Wheezing or Rhonchi. No rales. Heart:   Regular rate and rhythm,  no murmur, rub or gallop. Abdomen:   Soft, non-tender. Not distended. Bowel sounds normal.  
Extremities: No cyanosis. No edema. No clubbing Skin:     Texture, turgor normal. No rashes or lesions. Not Jaundiced Neurologic: Alert and oriented x 3, no focal deficits Data Review:  
Recent Results (from the past 24 hour(s)) CBC WITH AUTOMATED DIFF  
 Collection Time: 01/09/19  9:09 PM  
Result Value Ref Range WBC 15.5 (H) 4.3 - 11.1 K/uL  
 RBC 4.26 4.05 - 5.2 M/uL  
 HGB 12.5 11.7 - 15.4 g/dL HCT 39.0 35.8 - 46.3 % MCV 91.5 79.6 - 97.8 FL  
 MCH 29.3 26.1 - 32.9 PG  
 MCHC 32.1 31.4 - 35.0 g/dL  
 RDW 14.2 11.9 - 14.6 % PLATELET 926 039 - 518 K/uL MPV 10.5 9.4 - 12.3 FL ABSOLUTE NRBC 0.00 0.0 - 0.2 K/uL  
 DF AUTOMATED NEUTROPHILS 75 43 - 78 % LYMPHOCYTES 18 13 - 44 % MONOCYTES 5 4.0 - 12.0 % EOSINOPHILS 1 0.5 - 7.8 % BASOPHILS 0 0.0 - 2.0 % IMMATURE GRANULOCYTES 1 0.0 - 5.0 %  
 ABS. NEUTROPHILS 11.5 (H) 1.7 - 8.2 K/UL  
 ABS. LYMPHOCYTES 2.8 0.5 - 4.6 K/UL  
 ABS. MONOCYTES 0.8 0.1 - 1.3 K/UL  
 ABS. EOSINOPHILS 0.1 0.0 - 0.8 K/UL  
 ABS. BASOPHILS 0.1 0.0 - 0.2 K/UL  
 ABS. IMM. GRANS. 0.1 0.0 - 0.5 K/UL  
TYPE & SCREEN Collection Time: 01/09/19  9:09 PM  
Result Value Ref Range Crossmatch Expiration 01/12/2019 ABO/Rh(D) O POSITIVE Antibody screen NEG PROTHROMBIN TIME + INR Collection Time: 01/09/19  9:09 PM  
Result Value Ref Range Prothrombin time 13.5 11.7 - 14.5 sec INR 1.0    
PTT Collection Time: 01/09/19  9:09 PM  
Result Value Ref Range aPTT 27.4 24.7 - 39.8 SEC METABOLIC PANEL, COMPREHENSIVE Collection Time: 01/09/19  9:09 PM  
Result Value Ref Range Sodium 140 136 - 145 mmol/L Potassium 4.1 3.5 - 5.1 mmol/L Chloride 106 98 - 107 mmol/L  
 CO2 30 21 - 32 mmol/L Anion gap 4 (L) 7 - 16 mmol/L Glucose 124 (H) 65 - 100 mg/dL BUN 22 8 - 23 MG/DL Creatinine 0.83 0.6 - 1.0 MG/DL  
 GFR est AA >60 >60 ml/min/1.73m2 GFR est non-AA >60 >60 ml/min/1.73m2 Calcium 9.1 8.3 - 10.4 MG/DL Bilirubin, total 0.3 0.2 - 1.1 MG/DL  
 ALT (SGPT) 26 12 - 65 U/L  
 AST (SGOT) 20 15 - 37 U/L Alk. phosphatase 96 50 - 136 U/L Protein, total 6.9 6.3 - 8.2 g/dL Albumin 3.2 3.2 - 4.6 g/dL Globulin 3.7 (H) 2.3 - 3.5 g/dL A-G Ratio 0.9 (L) 1.2 - 3.5 EKG, 12 LEAD, INITIAL Collection Time: 01/09/19  9:24 PM  
Result Value Ref Range Ventricular Rate 86 BPM  
 Atrial Rate 86 BPM  
 P-R Interval 182 ms QRS Duration 78 ms Q-T Interval 358 ms QTC Calculation (Bezet) 428 ms Calculated P Axis 67 degrees Calculated R Axis 19 degrees Calculated T Axis 80 degrees Diagnosis    
  !! AGE AND GENDER SPECIFIC ECG ANALYSIS !! Normal sinus rhythm Nonspecific ST abnormality Abnormal ECG When compared with ECG of 09-JAN-2019 21:24, No significant change was found Imaging [place] Ritta Precise XR CHEST SNGL V Final Result IMPRESSION: Minimal linear atelectasis of the right lower lobe. XR HIP RT W OR WO PELV 2-3 VWS Final Result IMPRESSION: Comminuted intercondylar fracture. XR FEMUR RT 2 VS  
Final Result IMPRESSION: Comminuted intercondylar fracture. Assessment and Plan:  
There are no active hospital problems to display for this patient. Pt is a 66 y.o. female with no PMH who presents after fall with hip pain. R hip fracture 
- ortho consulted 
- NPO after midnight 
- pain control - SCDs for now, will need DVT prophylaxis ordered post-op - PPD, SW consult - PT consulted 
- EKG reviewed without acute ST-T wave changes. Pt able to climb over 2 flights of stairs without CP/SOB. She is low risk to proceed with surgery. Proph - SCDs Full code,  Gerhardt Jacobson is surrogate decision maker Dispo >2 midnights Signed By: Froylan Kimball MD   
 January 9, 2019

## 2019-01-10 NOTE — PROGRESS NOTES
Progress Note 1/10/2019 Admit Date: 2019  8:18 PM  
NAME: Vesta Rae :  1940 MRN:  [id number] Attending: Lesa Camarena MD 
PCP:  Kartik Mcguire MD 
Treatment Team: Attending Provider: Lesa Camarena MD; Consulting Provider: Dylan Kern MD; Consulting Provider: Hansel Caceres MD; Utilization Review: Dami Guzmán RN; Care Manager: Lele Mistry RN Full Code SUBJECTIVE:  
 
Ms. Wilmer Brush is a 65 yo female with no PMH who presented with c/o right hip pain after falling. Pt found to have a right intercondylar fracture. Pt s/p right femur insertion intra medullary nail today. Pt sitting up in chair doing well post op. Denies pain. History reviewed. No pertinent past medical history. Recent Results (from the past 24 hour(s)) PREALBUMIN Collection Time: 19  9:08 PM  
Result Value Ref Range Prealbumin 23.1 18.0 - 35.7 MG/DL  
PTH INTACT Collection Time: 19  9:08 PM  
Result Value Ref Range Calcium 9.0 8.3 - 10.4 MG/DL  
 PTH, Intact 59.2 18.5 - 88.0 pg/mL CBC WITH AUTOMATED DIFF Collection Time: 19  9:09 PM  
Result Value Ref Range WBC 15.5 (H) 4.3 - 11.1 K/uL  
 RBC 4.26 4.05 - 5.2 M/uL  
 HGB 12.5 11.7 - 15.4 g/dL HCT 39.0 35.8 - 46.3 % MCV 91.5 79.6 - 97.8 FL  
 MCH 29.3 26.1 - 32.9 PG  
 MCHC 32.1 31.4 - 35.0 g/dL  
 RDW 14.2 11.9 - 14.6 % PLATELET 209 477 - 300 K/uL MPV 10.5 9.4 - 12.3 FL ABSOLUTE NRBC 0.00 0.0 - 0.2 K/uL  
 DF AUTOMATED NEUTROPHILS 75 43 - 78 % LYMPHOCYTES 18 13 - 44 % MONOCYTES 5 4.0 - 12.0 % EOSINOPHILS 1 0.5 - 7.8 % BASOPHILS 0 0.0 - 2.0 % IMMATURE GRANULOCYTES 1 0.0 - 5.0 %  
 ABS. NEUTROPHILS 11.5 (H) 1.7 - 8.2 K/UL  
 ABS. LYMPHOCYTES 2.8 0.5 - 4.6 K/UL  
 ABS. MONOCYTES 0.8 0.1 - 1.3 K/UL  
 ABS. EOSINOPHILS 0.1 0.0 - 0.8 K/UL  
 ABS. BASOPHILS 0.1 0.0 - 0.2 K/UL  
 ABS. IMM. GRANS. 0.1 0.0 - 0.5 K/UL  
TYPE & SCREEN  Collection Time: 19  9:09 PM  
 Result Value Ref Range Crossmatch Expiration 01/12/2019 ABO/Rh(D) O POSITIVE Antibody screen NEG PROTHROMBIN TIME + INR Collection Time: 01/09/19  9:09 PM  
Result Value Ref Range Prothrombin time 13.5 11.7 - 14.5 sec INR 1.0    
PTT Collection Time: 01/09/19  9:09 PM  
Result Value Ref Range aPTT 27.4 24.7 - 39.8 SEC METABOLIC PANEL, COMPREHENSIVE Collection Time: 01/09/19  9:09 PM  
Result Value Ref Range Sodium 140 136 - 145 mmol/L Potassium 4.1 3.5 - 5.1 mmol/L Chloride 106 98 - 107 mmol/L  
 CO2 30 21 - 32 mmol/L Anion gap 4 (L) 7 - 16 mmol/L Glucose 124 (H) 65 - 100 mg/dL BUN 22 8 - 23 MG/DL Creatinine 0.83 0.6 - 1.0 MG/DL  
 GFR est AA >60 >60 ml/min/1.73m2 GFR est non-AA >60 >60 ml/min/1.73m2 Calcium 9.1 8.3 - 10.4 MG/DL Bilirubin, total 0.3 0.2 - 1.1 MG/DL  
 ALT (SGPT) 26 12 - 65 U/L  
 AST (SGOT) 20 15 - 37 U/L Alk. phosphatase 96 50 - 136 U/L Protein, total 6.9 6.3 - 8.2 g/dL Albumin 3.2 3.2 - 4.6 g/dL Globulin 3.7 (H) 2.3 - 3.5 g/dL A-G Ratio 0.9 (L) 1.2 - 3.5 EKG, 12 LEAD, INITIAL Collection Time: 01/09/19  9:24 PM  
Result Value Ref Range Ventricular Rate 86 BPM  
 Atrial Rate 86 BPM  
 P-R Interval 182 ms QRS Duration 78 ms Q-T Interval 358 ms QTC Calculation (Bezet) 428 ms Calculated P Axis 67 degrees Calculated R Axis 19 degrees Calculated T Axis 80 degrees Diagnosis    
  !! AGE AND GENDER SPECIFIC ECG ANALYSIS !! Normal sinus rhythm Nonspecific ST abnormality Abnormal ECG When compared with ECG of 09-JAN-2019 21:24, No significant change was found Confirmed by Maikel Ware MD (), YASMIN KHAN ([id number]) on 1/10/2019 1:30:03 PM 
  
MSSA/MRSA SC BY PCR, NASAL SWAB Collection Time: 01/10/19  2:06 AM  
Result Value Ref Range Special Requests: NO SPECIAL REQUESTS Culture result:     
  SA target not detected.                                  A MRSA NEGATIVE, SA NEGATIVE test result does not preclude MRSA or SA nasal colonization. URINALYSIS W/ RFLX MICROSCOPIC Collection Time: 01/10/19  2:06 AM  
Result Value Ref Range Color YELLOW Appearance CLEAR Specific gravity 1.024 (H) 1.001 - 1.023    
 pH (UA) 6.5 5.0 - 9.0 Protein NEGATIVE  NEG mg/dL Glucose NEGATIVE  mg/dL Ketone 15 (A) NEG mg/dL Bilirubin NEGATIVE  NEG Blood NEGATIVE  NEG Urobilinogen 0.2 0.2 - 1.0 EU/dL Nitrites NEGATIVE  NEG Leukocyte Esterase NEGATIVE  NEG No Known Allergies Current Facility-Administered Medications Medication Dose Route Frequency Provider Last Rate Last Dose  tuberculin injection 5 Units  5 Units IntraDERMal ONCE Laurent Ross MD   5 Units at 01/10/19 0128  traMADol (ULTRAM) tablet 50 mg  50 mg Oral Q6H PRN Marge Vergara, NP      
 lactated Ringers infusion  75 mL/hr IntraVENous CONTINUOUS Marge Vergara, NP 75 mL/hr at 01/10/19 1521 75 mL/hr at 01/10/19 1521  
 sodium chloride (NS) flush 5-40 mL  5-40 mL IntraVENous Q8H Marge Vergara, NP   5 mL at 01/10/19 1521  
 sodium chloride (NS) flush 5-40 mL  5-40 mL IntraVENous PRN Marge Vergara, NP      
 ceFAZolin (ANCEF) 1,000 mg in 0.9% sodium chloride (MBP/ADV) 50 mL ADV  1 g IntraVENous Q8H Marge Vergara, NP      
 acetaminophen (TYLENOL) tablet 650 mg  650 mg Oral Q8H Thalia Vergara, NP   650 mg at 01/10/19 1518  
 oxyCODONE IR (ROXICODONE) tablet 5 mg  5 mg Oral Q4H PRN Marge Vergara, NP   5 mg at 01/10/19 1521  
 ondansetron (ZOFRAN) injection 4 mg  4 mg IntraVENous Q4H PRN Marge Vergara, NP      
 docusate sodium (COLACE) capsule 100 mg  100 mg Oral BID Jai Thalia M, NP      
 alum-mag hydroxide-simeth (MYLANTA) oral suspension 30 mL  30 mL Oral Q4H PRN Marge Vergara NP      
 calcium-vitamin D (OS-AGUSTINA) 500 mg-200 unit tablet  1 Tab Oral TID WITH MEALS Marge Vergara NP   1 Tab at 01/10/19 7375  [START ON 2019] enoxaparin (LOVENOX) injection 40 mg  40 mg SubCUTAneous Q24H Kehinde Vergara NP Review of Systems negative with exception of pertinent positives noted above PHYSICAL EXAM  
 
Visit Vitals /66 Pulse 63 Temp 98.3 °F (36.8 °C) Resp 16 Ht 5' 5\" (1.651 m) Wt 77.1 kg (170 lb) SpO2 91% BMI 28.29 kg/m² Temp (24hrs), Av.1 °F (36.7 °C), Min:97.7 °F (36.5 °C), Max:99 °F (37.2 °C) Oxygen Therapy O2 Sat (%): 91 % (01/10/19 1537) Pulse via Oximetry: 63 beats per minute (01/10/19 1537) O2 Device: Nasal cannula (01/10/19 1537) O2 Flow Rate (L/min): 4 l/min (01/10/19 1537) Intake/Output Summary (Last 24 hours) at 1/10/2019 1755 Last data filed at 1/10/2019 1436 Gross per 24 hour Intake 900 ml Output 850 ml Net 50 ml General: No acute distress   
Lungs: CTA bilaterally. Resp even and nonlabored Heart:  S1S2 present without murmurs rubs gallops. RRR. No edema Abdomen: Soft, non tender, non distended. BS present Extremities: Limited ROM RLE. 2+ pedal pulses bilaterally. Dressing right hip c/d/i Neurologic:  A/O X4. No focal deficits Results summary of Diagnostic Studies/Procedures copied from within [facility] EMR: 
 
 
ASSESSMENT Active Hospital Problems Diagnosis Date Noted  Hip fracture ([facility] [address] 2019 Plan: 
 
Hip fx S/P repair today PT/OT Follow H/H Notes, labs, VS, diagnostic testing reviewed Time spent with pt 20 min DVT Prophylaxis: lovenox Plan of Care Discussed with: Supervising MD Dr. Katherine Campbell, care team, pt, family at bedside Stephany Gil NP

## 2019-01-10 NOTE — PROGRESS NOTES
Problem: Falls - Risk of 
Goal: *Absence of Falls Document Durenda Tacho Fall Risk and appropriate interventions in the flowsheet. Outcome: Progressing Towards Goal 
Fall Risk Interventions: 
  
 
  
 
Medication Interventions: Bed/chair exit alarm Elimination Interventions: Call light in reach, Patient to call for help with toileting needs History of Falls Interventions: Bed/chair exit alarm Problem: Pressure Injury - Risk of 
Goal: *Prevention of pressure injury Document Kevin Scale and appropriate interventions in the flowsheet. Outcome: Progressing Towards Goal 
Pressure Injury Interventions: Activity Interventions: Increase time out of bed, Pressure redistribution bed/mattress(bed type), PT/OT evaluation Mobility Interventions: HOB 30 degrees or less, Pressure redistribution bed/mattress (bed type), PT/OT evaluation Nutrition Interventions: Document food/fluid/supplement intake

## 2019-01-10 NOTE — ROUTINE PROCESS
TRANSFER - OUT REPORT: 
 
Verbal report given to Manjit Lowery RN on Nika and Bishop  being transferred to [address] [place] [address] for routine post - op Report consisted of patients Situation, Background, Assessment and  
Recommendations(SBAR). Information from the following report(s) SBAR, Kardex, OR Summary, Procedure Summary, Intake/Output and MAR was reviewed with the receiving nurse. Lines:  
Peripheral IV 01/10/19 Posterior;Right Hand (Active) Site Assessment Clean, dry, & intact 1/10/2019  1:53 PM  
Phlebitis Assessment 0 1/10/2019  1:53 PM  
Infiltration Assessment 0 1/10/2019  1:53 PM  
Dressing Status Clean, dry, & intact 1/10/2019  1:53 PM  
Dressing Type Transparent;Tape 1/10/2019  1:53 PM  
Hub Color/Line Status Infusing 1/10/2019  1:53 PM  
Alcohol Cap Used No 1/10/2019  1:53 PM  
  
 
Opportunity for questions and clarification was provided. Patient transported with: 
 O2 @ 4 liters VTE prophylaxis orders have been written for Nika and Bishop. Patient and family given floor number and nurses name. Family updated re: pt status after security code verified.

## 2019-01-10 NOTE — PROGRESS NOTES
01/10/19 0130 Dual Skin Pressure Injury Assessment Dual Skin Pressure Injury Assessment WDL Second Care Provider (Based on [address] [place] [address]) Guerrero Guo RN Skin Integumentary Skin Integumentary (WDL) WDL Skin Color Appropriate for ethnicity Skin Condition/Temp Warm;Dry Skin Integrity Intact Turgor Non-tenting Hair Growth Present Varicosities Present Primary Nurse Estela Newell and Guerrero Guo RN performed a dual skin assessment on this patient No impairment noted Kevin score is 16

## 2019-01-10 NOTE — ANESTHESIA PROCEDURE NOTES
Spinal Block Start time: 1/10/2019 12:53 PM 
End time: 1/10/2019 12:56 PM 
Performed by: Rebeca Cordoba MD 
Authorized by: Rebeca Cordoba MD  
 
Pre-procedure: 
 
Timeout Time: 12:53 Spinal Block:  
Patient Position:  Right lateral decubitus Prep Region:  Lumbar Prep: chlorhexidine and patient draped Location:  L2-3 Technique:  Single shot Needle:  
Needle Type:  Pencan Needle Gauge:  25 G Attempts:  1 Events: CSF confirmed, no blood with aspiration and no paresthesia Assessment: 
Insertion:  Uncomplicated Patient tolerance:  Patient tolerated the procedure well with no immediate complications

## 2019-01-10 NOTE — PROGRESS NOTES
Problem: Mobility Impaired (Adult and Pediatric) Goal: *Acute Goals and Plan of Care (Insert Text) STG: 
(1.)Ms. Florencia Perkins will move from supine to sit and sit to supine  with MINIMAL ASSIST within 3 treatment day(s). (2.)Ms. Florencia Perkins will transfer from bed to chair and chair to bed with CONTACT GUARD ASSIST using the least restrictive device within 3 treatment day(s). (3.)Ms. Rivas will ambulate with CONTACT GUARD ASSIST for 50 feet with the least restrictive device within 3 treatment day(s). LTG: 
(1.)Ms. Florencia Perkins will move from supine to sit and sit to supine  in bed with STAND BY ASSIST within 7 treatment day(s). (2.)Ms. Florencia Perkins will transfer from bed to chair and chair to bed with STAND BY ASSIST using the least restrictive device within 7 treatment day(s). (3.)Ms. Rivas will ambulate with STAND BY ASSIST for 100+ feet with the least restrictive device within 7 treatment day(s). ________________________________________________________________________________________________ PHYSICAL THERAPY: Initial Assessment, Treatment Day: Day of Assessment, PM 1/10/2019INPATIENT: Hospital Day: 2 [place] / Product Type: Medicare /  
  
NAME/AGE/GENDER: Spencer Burkitt is a 66 y.o. female PRIMARY DIAGNOSIS: Hip fracture ([facility] [address] [S72.009A] Hip fracture (HCC) Hip fracture ([facility] [address] Procedure(s) (LRB): 
RIGHT FEMUR INSERTION INTRA MEDULLARY NAIL WITH IM NAIL (Right) Day of Surgery ICD-10: Treatment Diagnosis:  
 · Generalized Muscle Weakness (M62.81) · Difficulty in walking, Not elsewhere classified (R26.2) · History of falling (Z91.81) Precaution/Allergies: 
Patient has no known allergies. WBAT R LE 
  
ASSESSMENT:  
Ms. Florencia Perkins is supine in bed upon contact and agreeable to PT evaluation and treatment this afternoon. Pt is day of surgery R Femur IM nailing.  Pt is A&O X 4 with reports of 3/10 pain having just received pain medication from nursing prior to PT arrival. Pt lives with her  in 1 story home with 0 steps to enter. Pt is independent with gait and ADLs and reports 1 recent fall resulting in hip fracture requiring repair. Pt currently on 3L O2 NC with O2 sat at 97% prior to mobility. Removed from O2. Reviewed WBAT and pt verbalized understanding. Pt transitioned supine to sit EOB with mod-max A X 2 requiring cues for technique. Pt demonstrating poor static sitting balance when first in sitting requiring support but improved with time, cues, and positioning. Pt performed STS to RW with Roland X 2 and ambulated 3 ft to bedside chair with RW and Roland X 2. Pt unable to clear feet from floor and transferred by pivoting LE towards chair. Pt required assist with walker management. Pt required Roland for controlled descent to chair. Pt positioned for comfort and left with all needs met and within reach with PCT at bedside for vitals. Pt with O2 sat at 86% therefore returned to NC. Nursing notified at end of session. Teresita Dumont will benefit from skilled PT (medically necessary) to address decreased strength, decreased balance, decreased functional tolerance, decreased cardiopulmonary endurance affecting participation in basic ADLs and functional tasks. This section established at most recent assessment PROBLEM LIST (Impairments causing functional limitations): 1. Decreased Strength 2. Decreased ADL/Functional Activities 3. Decreased Transfer Abilities 4. Decreased Ambulation Ability/Technique 5. Decreased Balance 6. Increased Pain 7. Decreased Activity Tolerance 8. Decreased Pacing Skills 9. Increased Fatigue 10. Increased Shortness of Breath 11. Decreased Flexibility/Joint Mobility 12. Decreased Knowledge of Precautions 13. Decreased Scurry with Home Exercise Program 
 INTERVENTIONS PLANNED: (Benefits and precautions of physical therapy have been discussed with the patient.) 1. Balance Exercise 2. Bed Mobility 3. Family Education 4. Gait Training 5. Home Exercise Program (HEP) 6. Neuromuscular Re-education/Strengthening 7. Range of Motion (ROM) 8. Therapeutic Activites 9. Therapeutic Exercise/Strengthening 10. Transfer Training TREATMENT PLAN: Frequency/Duration: twice daily for duration of hospital stay Rehabilitation Potential For Stated Goals: Good RECOMMENDED REHABILITATION/EQUIPMENT: (at time of discharge pending progress): Due to the probability of continued deficits (see above) this patient will likely need continued skilled physical therapy after discharge. Equipment:  
? None at this time HISTORY:  
History of Present Injury/Illness (Reason for Referral): S/p RIGHT FEMUR INSERTION INTRA MEDULLARY NAIL WITH IM NAIL Past Medical History/Comorbidities: Ms. Dwain Clark  has no past medical history on file. Ms. Dwain Clark  has no past surgical history on file. Social History/Living Environment:  
Home Environment: Private residence # Steps to Enter: 0 One/Two Story Residence: One story Living Alone: No 
Support Systems: Spouse/Significant Other/Partner Patient Expects to be Discharged to[de-identified] Rehabilitation facility Current DME Used/Available at Home: Cane, straight Prior Level of Function/Work/Activity: 
Independent with mobility, 1 fall Number of Personal Factors/Comorbidities that affect the Plan of Care: 1-2: MODERATE COMPLEXITY EXAMINATION:  
Most Recent Physical Functioning:  
Gross Assessment: 
AROM: Generally decreased, functional 
Strength: Generally decreased, functional 
Coordination: Generally decreased, functional 
         
  
Posture: 
  
Balance: 
Sitting: Intact; Without support Standing: Impaired;Pull to stand; With support Standing - Static: Fair;Constant support Standing - Dynamic : Fair Bed Mobility: 
Supine to Sit: Moderate assistance;Maximum assistance Scooting: Maximum assistance Wheelchair Mobility: 
  
Transfers: Sit to Stand: Minimum assistance; Moderate assistance;Assist x2 Stand to Sit: Minimum assistance Gait: 
Right Side Weight Bearing: As tolerated Base of Support: Narrowed; Center of gravity altered Speed/[place]: Shuffled; Slow Step Length: Left shortened;Right shortened Stance: Right decreased Gait Abnormalities: Decreased step clearance;Shuffling gait; Antalgic Distance (ft): 3 Feet (ft) Assistive Device: Walker, rolling Ambulation - Level of Assistance: Minimal assistance;Assist x2 Body Structures Involved: 1. Nerves 2. Heart 3. Lungs 4. Bones 5. Joints 6. Muscles 7. Ligaments Body Functions Affected: 1. Sensory/Pain 2. Cardio 3. Respiratory 4. Neuromusculoskeletal 
5. Movement Related Activities and Participation Affected: 1. General Tasks and Demands 2. Mobility 3. Self Care 4. Domestic Life 5. Interpersonal Interactions and Relationships 6. Community, Social and [place] Number of elements that affect the Plan of Care: 4+: HIGH COMPLEXITY CLINICAL PRESENTATION:  
Presentation: Evolving clinical presentation with changing clinical characteristics: MODERATE COMPLEXITY CLINICAL DECISION MAKING:  
[facility] 6 Clicks Basic Mobility Inpatient Short Form How much difficulty does the patient currently have. .. Unable A Lot A Little None 1. Turning over in bed (including adjusting bedclothes, sheets and blankets)? [] 1   [x] 2   [] 3   [] 4  
2. Sitting down on and standing up from a chair with arms ( e.g., wheelchair, bedside commode, etc.)   [] 1   [] 2   [x] 3   [] 4  
3. Moving from lying on back to sitting on the side of the bed? [] 1   [x] 2   [] 3   [] 4 How much help from another person does the patient currently need. .. Total A Lot A Little None 4. Moving to and from a bed to a chair (including a wheelchair)? [] 1   [] 2   [x] 3   [] 4  
5. Need to walk in hospital room?    [] 1   [x] 2   [] 3   [] 4  
 6.  Climbing 3-5 steps with a railing? [x] 1   [] 2   [] 3   [] 4  
© 2007, Trustees of [facility], under license to [employer]. All rights reserved Score:  Initial: 13 Most Recent: X (Date: -- ) Interpretation of Tool:  Represents activities that are increasingly more difficult (i.e. Bed mobility, Transfers, Gait). Score 24 23 22-20 19-15 14-10 9-7 6 Modifier CH CI CJ CK CL CM CN   
 
? Mobility - Walking and Moving Around:  
  - CURRENT STATUS: CL - 60%-79% impaired, limited or restricted  - GOAL STATUS: CK - 40%-59% impaired, limited or restricted  - D/C STATUS:  ---------------To be determined--------------- [place] / [place] / Product Type: Medicare /   
 
Medical Necessity:    
· Patient is expected to demonstrate progress in strength, range of motion, balance, coordination and functional technique to decrease assistance required with gait, transfers, and functional mobility. Michelle Castellano Reason for Services/Other Comments: 
· Patient continues to require skilled intervention due to decreased strength, decreased balance, decreased functional tolerance, decreased cardiopulmonary endurance affecting participation in basic ADLs and functional tasks. Use of outcome tool(s) and clinical judgement create a POC that gives a: Clear prediction of patient's progress: LOW COMPLEXITY  
  
 
 
 
TREATMENT:  
(In addition to Assessment/Re-Assessment sessions the following treatments were rendered) Pre-treatment Symptoms/Complaints:  Post op pain 
Pain: Initial:  
Pain Intensity 1: 3  Post Session:  Increased with mobility 5/10 In addition to evaluation: 
Therapeutic Activity: (    10 minutes): Therapeutic activities including Bed transfers, Chair transfers, Ambulation on level ground and cues for ease and safety of transfers, walker management to improve mobility, strength, balance and coordination.   Required maximal   to promote static and dynamic balance in standing and promote coordination of bilateral, upper extremity(s), lower extremity(s). Braces/Orthotics/Lines/Etc:  
· IV 
· alegria catheter · O2 Device: Nasal cannula Treatment/Session Assessment:   
· Response to Treatment:  Amb bed to chair with Roland X 2 
· Interdisciplinary Collaboration:  
o Physical Therapist 
o Registered Nurse 
o Rehabilitation Attendant 
o Certified Nursing Assistant/Patient Care Technician · After treatment position/precautions:  
o Up in chair 
o Bed alarm/tab alert on 
o Bed/Chair-wheels locked 
o Bed in low position 
o Call light within reach 
o RN notified 
o PCT at bedside · Compliance with Program/Exercises: Will assess as treatment progresses · Recommendations/Intent for next treatment session: \"Next visit will focus on advancements to more challenging activities and reduction in assistance provided\". Total Treatment Duration: PT Patient Time In/Time Out Time In: 8969 Time Out: 1032 Jennifer Ramirez

## 2019-01-11 LAB
BASOPHILS # BLD: 0 K/UL (ref 0–0.2)
BASOPHILS NFR BLD: 0 % (ref 0–2)
DIFFERENTIAL METHOD BLD: ABNORMAL
EOSINOPHIL # BLD: 0.2 K/UL (ref 0–0.8)
EOSINOPHIL NFR BLD: 2 % (ref 0.5–7.8)
ERYTHROCYTE [DISTWIDTH] IN BLOOD BY AUTOMATED COUNT: 14.4 % (ref 11.9–14.6)
HCT VFR BLD AUTO: 31.7 % (ref 35.8–46.3)
HGB BLD-MCNC: 10.1 G/DL (ref 11.7–15.4)
IMM GRANULOCYTES # BLD AUTO: 0 K/UL (ref 0–0.5)
IMM GRANULOCYTES NFR BLD AUTO: 0 % (ref 0–5)
LYMPHOCYTES # BLD: 1.4 K/UL (ref 0.5–4.6)
LYMPHOCYTES NFR BLD: 15 % (ref 13–44)
MCH RBC QN AUTO: 29.7 PG (ref 26.1–32.9)
MCHC RBC AUTO-ENTMCNC: 31.9 G/DL (ref 31.4–35)
MCV RBC AUTO: 93.2 FL (ref 79.6–97.8)
MM INDURATION POC: 0 MM (ref 0–5)
MONOCYTES # BLD: 0.8 K/UL (ref 0.1–1.3)
MONOCYTES NFR BLD: 9 % (ref 4–12)
NEUTS SEG # BLD: 7 K/UL (ref 1.7–8.2)
NEUTS SEG NFR BLD: 74 % (ref 43–78)
NRBC # BLD: 0 K/UL (ref 0–0.2)
PLATELET # BLD AUTO: 163 K/UL (ref 150–450)
PMV BLD AUTO: 11.1 FL (ref 9.4–12.3)
PPD POC: NEGATIVE NEGATIVE
RBC # BLD AUTO: 3.4 M/UL (ref 4.05–5.2)
WBC # BLD AUTO: 9.5 K/UL (ref 4.3–11.1)

## 2019-01-11 PROCEDURE — 97530 THERAPEUTIC ACTIVITIES: CPT

## 2019-01-11 PROCEDURE — 77030020255 HC SOL INJ LR 1000ML BG

## 2019-01-11 PROCEDURE — 74011000258 HC RX REV CODE- 258: Performed by: NURSE PRACTITIONER

## 2019-01-11 PROCEDURE — 85025 COMPLETE CBC W/AUTO DIFF WBC: CPT

## 2019-01-11 PROCEDURE — 96372 THER/PROPH/DIAG INJ SC/IM: CPT

## 2019-01-11 PROCEDURE — 97535 SELF CARE MNGMENT TRAINING: CPT

## 2019-01-11 PROCEDURE — 99218 HC RM OBSERVATION: CPT

## 2019-01-11 PROCEDURE — 36415 COLL VENOUS BLD VENIPUNCTURE: CPT

## 2019-01-11 PROCEDURE — 97116 GAIT TRAINING THERAPY: CPT

## 2019-01-11 PROCEDURE — 74011250637 HC RX REV CODE- 250/637: Performed by: NURSE PRACTITIONER

## 2019-01-11 PROCEDURE — 77030032490 HC SLV COMPR SCD KNE COVD -B

## 2019-01-11 PROCEDURE — 65270000029 HC RM PRIVATE

## 2019-01-11 PROCEDURE — 74011250636 HC RX REV CODE- 250/636: Performed by: NURSE PRACTITIONER

## 2019-01-11 PROCEDURE — 97165 OT EVAL LOW COMPLEX 30 MIN: CPT

## 2019-01-11 RX ADMIN — ENOXAPARIN SODIUM 40 MG: 40 INJECTION SUBCUTANEOUS at 12:46

## 2019-01-11 RX ADMIN — TRAMADOL HYDROCHLORIDE 50 MG: 50 TABLET, FILM COATED ORAL at 21:49

## 2019-01-11 RX ADMIN — Medication 10 ML: at 05:37

## 2019-01-11 RX ADMIN — CALCIUM CARBONATE 500 MG (1,250 MG)-VITAMIN D3 200 UNIT TABLET 1 TABLET: at 08:07

## 2019-01-11 RX ADMIN — ACETAMINOPHEN 650 MG: 325 TABLET ORAL at 05:37

## 2019-01-11 RX ADMIN — Medication 5 ML: at 15:05

## 2019-01-11 RX ADMIN — ACETAMINOPHEN 650 MG: 325 TABLET ORAL at 13:47

## 2019-01-11 RX ADMIN — CALCIUM CARBONATE 500 MG (1,250 MG)-VITAMIN D3 200 UNIT TABLET 1 TABLET: at 12:46

## 2019-01-11 RX ADMIN — DOCUSATE SODIUM 100 MG: 100 CAPSULE, LIQUID FILLED ORAL at 08:07

## 2019-01-11 RX ADMIN — OXYCODONE HYDROCHLORIDE 5 MG: 5 TABLET ORAL at 05:37

## 2019-01-11 RX ADMIN — SODIUM CHLORIDE 1000 MG: 900 INJECTION, SOLUTION INTRAVENOUS at 05:38

## 2019-01-11 RX ADMIN — ACETAMINOPHEN 650 MG: 325 TABLET ORAL at 21:49

## 2019-01-11 RX ADMIN — Medication 5 ML: at 21:49

## 2019-01-11 RX ADMIN — CALCIUM CARBONATE 500 MG (1,250 MG)-VITAMIN D3 200 UNIT TABLET 1 TABLET: at 17:27

## 2019-01-11 RX ADMIN — SODIUM CHLORIDE, SODIUM LACTATE, POTASSIUM CHLORIDE, AND CALCIUM CHLORIDE 75 ML/HR: 600; 310; 30; 20 INJECTION, SOLUTION INTRAVENOUS at 05:38

## 2019-01-11 RX ADMIN — DOCUSATE SODIUM 100 MG: 100 CAPSULE, LIQUID FILLED ORAL at 17:27

## 2019-01-11 NOTE — PROGRESS NOTES
Referrals sent to [facility], [facility], and [facility] per patient and family request.

## 2019-01-11 NOTE — PROGRESS NOTES
Hospitalist Progress Note Subjective:  
Daily Progress Note: 1/11/2019 8:58 AM 
 
Patient presented to ER 1/9 after she tripped and fell at [employer] [facility], landing on her right hip. She was unable to rise after incident due to pain in right hip. No additional injury or LOC. Found with a comminuted intercondylar fracture of the right femur. ER MD spoke with Dr Abhishek Langley, who agrees to see patient. Admitted by Hospitalist, Ortho fracture with plans for OR 1/10. She is extremely healthy and takes no meds at home. 1/10:  Hgb 10.1. Underwent right femoral medullary nail insertion per Dr Abhishek Langley under SAB abd tolerated well. Stable vitals, no nausea and well controlled pain post op. Up in chair post op with PT.  
 
1/11:  Postop day 1. Up in chair, doing well. Pain controlled, eating, drinking and voiding. Hbg: 10.1. Planning for rehab.  at bedside. ADDITIONAL HISTORY:  Mild osteoporosis, [place] Current Facility-Administered Medications Medication Dose Route Frequency  traMADol (ULTRAM) tablet 50 mg  50 mg Oral Q6H PRN  
 lactated Ringers infusion  75 mL/hr IntraVENous CONTINUOUS  
 sodium chloride (NS) flush 5-40 mL  5-40 mL IntraVENous Q8H  
 sodium chloride (NS) flush 5-40 mL  5-40 mL IntraVENous PRN  
 acetaminophen (TYLENOL) tablet 650 mg  650 mg Oral Q8H  
 oxyCODONE IR (ROXICODONE) tablet 5 mg  5 mg Oral Q4H PRN  
 ondansetron (ZOFRAN) injection 4 mg  4 mg IntraVENous Q4H PRN  
 docusate sodium (COLACE) capsule 100 mg  100 mg Oral BID  alum-mag hydroxide-simeth (MYLANTA) oral suspension 30 mL  30 mL Oral Q4H PRN  
 calcium-vitamin D (OS-AGUSTINA) 500 mg-200 unit tablet  1 Tab Oral TID WITH MEALS  enoxaparin (LOVENOX) injection 40 mg  40 mg SubCUTAneous Q24H Review of Systems A comprehensive review of systems was negative except for that written in the HPI. Objective:  
 
Visit Vitals /64 (BP 1 Location: Right arm, BP Patient Position: At rest) Pulse 78 Temp 99.1 °F (37.3 °C) Resp 18 Ht 5' 5\" (1.651 m) Wt 77.1 kg (170 lb) SpO2 90% BMI 28.29 kg/m² O2 Flow Rate (L/min): 4 l/min O2 Device: Nasal cannula Temp (24hrs), Av.4 °F (36.9 °C), Min:97.7 °F (36.5 °C), Max:99.4 °F (37.4 °C) 
 
701 - 1900 In: -  
Out: 800 [Urine:800] 1901 -  07 In: 900 [I.V.:900] Out: 850 [Urine:800] General appearance:  Oriented and alert, cooperative,up in chair, doing well. Head: Normocephalic, without obvious abnormality, atraumatic Throat: Lips, mucosa, and tongue normal. Teeth and gums normal 
Neck: supple, symmetrical, trachea midline, no adenopathy, no JVD Lungs: clear to auscultation bilaterally Heart: regular rate and rhythm, S1, S2 normal, no murmur, click, rub or gallop Abdomen: soft, non-tender. Bowel sounds normal. No masses,  no organomegaly. Eating, drinking, voiding. Passing flatus. Extremities: Dry intact dressing to right hip extremities normal, atraumatic, no cyanosis or edema Skin: Skin color, texture, turgor normal. No rashes or lesions Neurologic: Grossly normal 
 
Additional comments: Notes,orders, test results, vitals reviewed Data Review Recent Results (from the past 24 hour(s)) PLEASE READ & DOCUMENT PPD TEST IN 24 HRS Collection Time: 19  1:28 AM  
Result Value Ref Range PPD negative Negative  
 mm Induration 0 mm CBC WITH AUTOMATED DIFF Collection Time: 19  5:20 AM  
Result Value Ref Range WBC 9.5 4.3 - 11.1 K/uL  
 RBC 3.40 (L) 4.05 - 5.2 M/uL  
 HGB 10.1 (L) 11.7 - 15.4 g/dL HCT 31.7 (L) 35.8 - 46.3 % MCV 93.2 79.6 - 97.8 FL  
 MCH 29.7 26.1 - 32.9 PG  
 MCHC 31.9 31.4 - 35.0 g/dL  
 RDW 14.4 11.9 - 14.6 % PLATELET 733 640 - 460 K/uL MPV 11.1 9.4 - 12.3 FL ABSOLUTE NRBC 0.00 0.0 - 0.2 K/uL  
 DF AUTOMATED NEUTROPHILS 74 43 - 78 % LYMPHOCYTES 15 13 - 44 % MONOCYTES 9 4.0 - 12.0 % EOSINOPHILS 2 0.5 - 7.8 %  BASOPHILS 0 0.0 - 2.0 %  
 IMMATURE GRANULOCYTES 0 0.0 - 5.0 %  
 ABS. NEUTROPHILS 7.0 1.7 - 8.2 K/UL  
 ABS. LYMPHOCYTES 1.4 0.5 - 4.6 K/UL  
 ABS. MONOCYTES 0.8 0.1 - 1.3 K/UL  
 ABS. EOSINOPHILS 0.2 0.0 - 0.8 K/UL  
 ABS. BASOPHILS 0.0 0.0 - 0.2 K/UL  
 ABS. IMM. GRANS. 0.0 0.0 - 0.5 K/UL  
 
RIGHT HIP:  Comminuted intercondylar fracture. The acetabulum and hip joint 
are intact. No other fractures are identified. IMPRESSION: : Minimal linear atelectasis of the right lower lobe. RIGHT FEMUR: views of the right femur demonstrate a comminuted intercondylar fracture. The femoral head and acetabulum are intact. The mid and distal femur are intact. Jose Bojorquez IMPRESSION: Comminuted intercondylar fracture. 
  
 CXR:  : Minimal linear atelectasis of the right lower lobe. Assessment/Plan:  
Mechanical fall with right hip trauma Right Hip fracture S/P right femoral intra medullary nail insertion Ortho fracture center routine orders Plans for rehab on discharge Osteopenia Hard of hearing Care Plan discussed with: Patient/Family and Nurse Signed By: Ryan Mccormack NP   
 January 11, 2019

## 2019-01-11 NOTE — BRIEF OP NOTE
BRIEF OPERATIVE NOTE Date of Procedure: 1/10/2019 Preoperative Diagnosis: FX RIGHT HIP Postoperative Diagnosis: Right Intertrochanteric Fracture Procedure(s): RIGHT FEMUR INSERTION INTRA MEDULLARY NAIL WITH IM NAIL Surgeon(s) and Role: 
   * Eva De La Paz MD - Primary Surgical Assistant: NONE Surgical Staff: 
Circ-1: Salena Greene RN 
Circ-2: Mundo Schmidt, MESSI; Brittney Martinez, MESSI Scrub Tech-1: Castillo Hull Scrub Private/Assistant: Merlin Fernandez Event Time In Time Out Incision Start 01/10/2019 1318 Incision Close 01/10/2019 1343 Anesthesia: Spinal  
Estimated Blood Loss: 100 CC Specimens: * No specimens in log * Findings: NONE Complications: NONE Implants:  
Implant Name Type Inv. Item Serial No.  Lot No. LRB No. Used Action NAIL LIZZIE [id number] RT -- TF[employer] STRL - [id number] Nail NAIL LIZZIE 130D [id number] RT -- Nelma Jamarcus [place] [id number] Right 1 Implanted BLADE HELCL FEN 100MM STRL -- TFNA - [id number]  BLADE HELCL FEN 100MM STRL -- TFNA  SYNTHES [place] [id number] Right 1 Implanted

## 2019-01-11 NOTE — PROGRESS NOTES
Problem: Falls - Risk of 
Goal: *Absence of Falls Document Connie Beadedith Fall Risk and appropriate interventions in the flowsheet. Outcome: Progressing Towards Goal 
Fall Risk Interventions: 
Mobility Interventions: Bed/chair exit alarm, OT consult for ADLs, Patient to call before getting OOB, PT Consult for mobility concerns Medication Interventions: Bed/chair exit alarm, Evaluate medications/consider consulting pharmacy, Patient to call before getting OOB Elimination Interventions: Call light in reach, Bed/chair exit alarm, Patient to call for help with toileting needs History of Falls Interventions: Bed/chair exit alarm, Door open when patient unattended, Investigate reason for fall Problem: Pressure Injury - Risk of 
Goal: *Prevention of pressure injury Document Kevin Scale and appropriate interventions in the flowsheet. Outcome: Progressing Towards Goal 
Pressure Injury Interventions: Activity Interventions: Increase time out of bed, Pressure redistribution bed/mattress(bed type), PT/OT evaluation Mobility Interventions: HOB 30 degrees or less, Pressure redistribution bed/mattress (bed type), PT/OT evaluation Nutrition Interventions: Document food/fluid/supplement intake, Offer support with meals,snacks and hydration

## 2019-01-11 NOTE — CONSULTS
Geriatric Fracture Consult  Patient: Jammie Broussard  YOB: 1940   MRN: [id number]      Consult Date: 1/10/2019     Consulting Physician: DR Gates    Chief Complaint: RIGHT INTERTROCHANTERIC HIP FRACTURE; OSTEOPOROSIS  History of Present Illness: Jammie Broussard is a 66 y.o.  female who is being seen for right hip pain after sustaining a fall at [employer]. Onset of symptoms was abrupt with unchanged course since that time. The pain is located in the right hip. Patient describes the pain as continuous and rated as moderate. Pain has been associated with movement. Patient denies other injuries. Review of Systems: A comprehensive review of systems was negative except for that written in the History of Present Illness. ED Presentation Time: < 8 hours  Mechanism of Injury: Fall from standing  Ambulatory Status: Independent  Past Medical History: History reviewed. No pertinent past medical history. Allergies: No Known Allergies   Past Surgical History: History reviewed. No pertinent surgical history.    Social History:   Social History     Socioeconomic History    Marital status:      Spouse name: Not on file    Number of children: Not on file    Years of education: Not on file    Highest education level: Not on file   Social Needs    Financial resource strain: Not on file    Food insecurity - worry: Not on file    Food insecurity - inability: Not on file   [employer] needs - medical: Not on file   [employer] needs - non-medical: Not on file   Occupational History    Not on file   Tobacco Use    Smoking status: Not on file   Substance and Sexual Activity    Alcohol use: Not on file    Drug use: Not on file    Sexual activity: Not on file   Other Topics Concern    Not on file   Social History Narrative    Not on file      FAMILY HISTORY - REVIEWED - NO PERTINENT FAMILY HISTORY  Dwelling Status: Alone with Spouse/Significant Other  Current Anticoagulant Medications: DENIES  History of falls: NO  Prior Fractures: DENIES  Osteoporosis Medications: none  Bone Density Tests: UNKNOWN  X-RAYS: Right Intertrochanteric Fracture  Physical Exam:   PATIENT COMPLAINING OF RIGHT HIP PAIN AFTER A FALL AT [employer]. FOCUSED MUSCULOSKELETAL EXAM REVEALS DECREASED ROM TO RIGHT LE. THERE IS SHORTENING AND EXTERNAL ROTATION NOTED TO RIGHT LE. PATIENT IS TENDER TO PALPATION OVER RIGHT HIP AND GROIN. PATIENT HAS PAIN WITH LOG ROLLING. N/V INTACT. DENIES OTHER INJURIES.     Assessment / Plan: ORIF RIGHT IT FRACTURE WITH IM NAIL; CONSULT PT/OT - WBAT RIGHT LE; STR  RISKS AND BENEFITS WERE ADDRESSED WITH PATIENT AND FAMILY  Labs:    Lab Results   Component Value Date/Time    HGB 10.1 (L) 01/11/2019 05:20 AM    WBC 9.5 01/11/2019 05:20 AM    INR 1.0 01/09/2019 09:09 PM    Albumin 3.2 01/09/2019 09:09 PM      Preoperative Clearance: YES by Hospitalist          Signed by: Jose Peñaloza NP   Today's Date: January 11, 2019

## 2019-01-11 NOTE — PROGRESS NOTES
Problem: Mobility Impaired (Adult and Pediatric) Goal: *Acute Goals and Plan of Care (Insert Text) STG: 
(1.)Ms. Paula Davenport will move from supine to sit and sit to supine  with MINIMAL ASSIST within 3 treatment day(s). (2.)Ms. Paula Davenport will transfer from bed to chair and chair to bed with CONTACT GUARD ASSIST using the least restrictive device within 3 treatment day(s). (3.)Ms. Rivas will ambulate with CONTACT GUARD ASSIST for 50 feet with the least restrictive device within 3 treatment day(s). LTG: 
(1.)Ms. Paula Davenport will move from supine to sit and sit to supine  in bed with STAND BY ASSIST within 7 treatment day(s). (2.)Ms. Paula Davenpotr will transfer from bed to chair and chair to bed with STAND BY ASSIST using the least restrictive device within 7 treatment day(s). (3.)Ms. Rivas will ambulate with STAND BY ASSIST for 100+ feet with the least restrictive device within 7 treatment day(s). ________________________________________________________________________________________________ PHYSICAL THERAPY: Daily Note, Treatment Day: 1st, PM 1/11/2019INPATIENT: Hospital Day: 3 [place] / Product Type: Medicare /  
  
NAME/AGE/GENDER: Deepa Milton is a 66 y.o. female PRIMARY DIAGNOSIS: Hip fracture ([facility] [address] [S72.009A] Hip fracture (HCC) Hip fracture ([facility] [address] Procedure(s) (LRB): 
RIGHT FEMUR INSERTION INTRA MEDULLARY NAIL WITH IM NAIL (Right) 1 Day Post-Op ICD-10: Treatment Diagnosis:  
 · Generalized Muscle Weakness (M62.81) · Difficulty in walking, Not elsewhere classified (R26.2) · History of falling (Z91.81) Precaution/Allergies: 
Patient has no known allergies. WBAT R LE 
  
ASSESSMENT:  
Ms. Paula Davenport was sitting up in recliner chair upon contact and agreeable to PT. Patient requires additional time throughout treatment but does participate great and is motivated.  Patient transfers to standing with min assist and cues for hand placement. Once standing patient performs gait training x 2' (to [facility]) the 6' (to EOB) with use of rolling walker, CGA-min assist, and below cues in gait section including cues for proper walker manipulation and sequencing with rolling walker. Patient scoots RLE forward versus taking real steps and demonstrates decreased weight shifting onto RLE to advance LLE. Patient returns to EOB and to supine with mod assist. cliner chair with PCT at bedside. Overall good progress towards physical therapy goals. No goals have been met thus far. Will continue efforts. This section established at most recent assessment PROBLEM LIST (Impairments causing functional limitations): 1. Decreased Strength 2. Decreased ADL/Functional Activities 3. Decreased Transfer Abilities 4. Decreased Ambulation Ability/Technique 5. Decreased Balance 6. Increased Pain 7. Decreased Activity Tolerance 8. Decreased Pacing Skills 9. Increased Fatigue 10. Increased Shortness of Breath 11. Decreased Flexibility/Joint Mobility 12. Decreased Knowledge of Precautions 13. Decreased [place] with Home Exercise Program 
 INTERVENTIONS PLANNED: (Benefits and precautions of physical therapy have been discussed with the patient.) 1. Balance Exercise 2. Bed Mobility 3. Family Education 4. Gait Training 5. Home Exercise Program (HEP) 6. Neuromuscular Re-education/Strengthening 7. Range of Motion (ROM) 8. Therapeutic Activites 9. Therapeutic Exercise/Strengthening 10. Transfer Training TREATMENT PLAN: Frequency/Duration: twice daily for duration of hospital stay Rehabilitation Potential For Stated Goals: Good RECOMMENDED REHABILITATION/EQUIPMENT: (at time of discharge pending progress): Due to the probability of continued deficits (see above) this patient will likely need continued skilled physical therapy after discharge. Equipment:  
? None at this time HISTORY:  
 History of Present Injury/Illness (Reason for Referral): S/p RIGHT FEMUR INSERTION INTRA MEDULLARY NAIL WITH IM NAIL Past Medical History/Comorbidities: Ms. Lynsey Campuzano  has no past medical history on file. Ms. Lynsey Camupzano  has a past surgical history that includes RIGHT FEMUR INSERTION INTRA MEDULLARY NAIL WITH IM NAIL (Right, 1/10/2019). Social History/Living Environment:  
Home Environment: Private residence # Steps to Enter: 0 One/Two Story Residence: One story Living Alone: No 
Support Systems: Spouse/Significant Other/Partner Patient Expects to be Discharged to[de-identified] Rehabilitation facility Current DME Used/Available at Home: Rafat Korina, straight, Walker, rolling, Wheelchair Tub or Shower Type: Tub/Shower combination Prior Level of Function/Work/Activity: 
Independent with mobility, 1 fall Number of Personal Factors/Comorbidities that affect the Plan of Care: 1-2: MODERATE COMPLEXITY EXAMINATION:  
Most Recent Physical Functioning:  
Gross Assessment: 
  
         
  
Posture: 
  
Balance: 
Sitting: Intact Standing: Impaired Standing - Static: Constant support;Good Standing - Dynamic : Fair Bed Mobility: 
Supine to Sit: Maximum assistance Sit to Supine: Moderate assistance Wheelchair Mobility: 
  
Transfers: 
Sit to Stand: Minimum assistance Stand to Sit: Minimum assistance Gait: 
Right Side Weight Bearing: As tolerated Base of Support: Center of gravity altered;Shift to left Speed/[place]: Shuffled; Slow Step Length: Right shortened;Left shortened Gait Abnormalities: Decreased step clearance; Step to gait; Antalgic Distance (ft): (2' then 6') Assistive Device: Walker, rolling Ambulation - Level of Assistance: Minimal assistance Interventions: Safety awareness training; Tactile cues; Verbal cues Body Structures Involved: 1. Nerves 2. Heart 3. Lungs 4. Bones 5. Joints 6. Muscles 7. Ligaments Body Functions Affected: 1. Sensory/Pain 2. Cardio 3. Respiratory 4.  Neuromusculoskeletal 
 5. Movement Related Activities and Participation Affected: 1. General Tasks and Demands 2. Mobility 3. Self Care 4. Domestic Life 5. Interpersonal Interactions and Relationships 6. Community, Social and [place] Number of elements that affect the Plan of Care: 4+: HIGH COMPLEXITY CLINICAL PRESENTATION:  
Presentation: Evolving clinical presentation with changing clinical characteristics: MODERATE COMPLEXITY CLINICAL DECISION MAKING:  
[facility] AM-PAC 6 Clicks Basic Mobility Inpatient Short Form How much difficulty does the patient currently have. .. Unable A Lot A Little None 1. Turning over in bed (including adjusting bedclothes, sheets and blankets)? [] 1   [x] 2   [] 3   [] 4  
2. Sitting down on and standing up from a chair with arms ( e.g., wheelchair, bedside commode, etc.)   [] 1   [] 2   [x] 3   [] 4  
3. Moving from lying on back to sitting on the side of the bed? [] 1   [x] 2   [] 3   [] 4 How much help from another person does the patient currently need. .. Total A Lot A Little None 4. Moving to and from a bed to a chair (including a wheelchair)? [] 1   [] 2   [x] 3   [] 4  
5. Need to walk in hospital room? [] 1   [x] 2   [] 3   [] 4  
6. Climbing 3-5 steps with a railing? [x] 1   [] 2   [] 3   [] 4  
© 2007, Trustees of [facility], under license to [employer]. All rights reserved Score:  Initial: 13 Most Recent: X (Date: -- ) Interpretation of Tool:  Represents activities that are increasingly more difficult (i.e. Bed mobility, Transfers, Gait). Score 24 23 22-20 19-15 14-10 9-7 6 Modifier CH CI CJ CK CL CM CN   
 
? Mobility - Walking and Moving Around:  
  - CURRENT STATUS: CL - 60%-79% impaired, limited or restricted  - GOAL STATUS: CK - 40%-59% impaired, limited or restricted  - D/C STATUS:  ---------------To be determined--------------- [place] / [place] / Product Type: Medicare /   
 
Medical Necessity:    
· Patient is expected to demonstrate progress in strength, range of motion, balance, coordination and functional technique to decrease assistance required with gait, transfers, and functional mobility. Katia Rodney Reason for Services/Other Comments: 
· Patient continues to require skilled intervention due to decreased strength, decreased balance, decreased functional tolerance, decreased cardiopulmonary endurance affecting participation in basic ADLs and functional tasks. Use of outcome tool(s) and clinical judgement create a POC that gives a: Clear prediction of patient's progress: LOW COMPLEXITY  
  
 
 
 
TREATMENT:  
(In addition to Assessment/Re-Assessment sessions the following treatments were rendered) Pre-treatment Symptoms/Complaints:  none Pain: Initial:  
Pain Intensity 1: 0  Post Session: no Therapeutic Activity: (    15 minutes): Therapeutic activities including bed mobility training, transfer training from various surface heights, static/dynamic sitting/standing balance training, scooting, instruction in sequencing with rolling walker, posture training, and patient education to improve mobility, strength, balance and coordination. Required moderate Safety awareness training; Tactile cues; Verbal cues to promote static and dynamic balance in standing and promote coordination of bilateral, upper extremity(s), lower extremity(s). Gait Training (  10 Minutes):  Gait training to improve and/or restore physical functioning as related to mobility, strength and balance. Ambulated (2' then 6') with Minimal assistance using a Walker, rolling and moderate Safety awareness training; Tactile cues; Verbal cues related to their sequencing, weight shifts, walker manipulation, UE support on rolling walker, foot placement, and step length to promote proper body alignment, promote proper body posture and promote proper body mechanics. Instruction in performance of the above deficits to correct overall gait quality and functional mobility. Braces/Orthotics/Lines/Etc:  
· IV 
· O2 Device: Nasal cannula Treatment/Session Assessment:   
· Response to Treatment:  See above · Interdisciplinary Collaboration:  
o Physical Therapy Assistant 
o Registered Nurse · After treatment position/precautions:  
o Supine in bed 
o Bed alarm/tab alert on 
o Bed/Chair-wheels locked 
o Bed in low position 
o Call light within reach 
o RN notified 
o Family at bedside · Compliance with Program/Exercises: Will assess as treatment progresses · Recommendations/Intent for next treatment session: \"Next visit will focus on advancements to more challenging activities and reduction in assistance provided\". Total Treatment Duration: PT Patient Time In/Time Out Time In: 0364 Time Out: 3106 Bailey Villagran PTA

## 2019-01-11 NOTE — PROGRESS NOTES
Problem: Self Care Deficits Care Plan (Adult) Goal: *Acute Goals and Plan of Care (Insert Text) 1. Patient will complete functional transfers with supervision and adaptive equipment as needed. 2. Patient will complete lower body bathing and dressing with minimal assistance and adaptive equipment as needed. 3. Patient will complete toileting with minimal assistance. 4. Patient will participate in BUE therapeutic exercises to increase strength in BUE to 5/5 aid in functional transfers. 5. Patient will tolerate at least 20 minutes of OT treatment with no rest breaks to increase activity tolerance for ADLs. Timeframe: 7 visits OCCUPATIONAL THERAPY: Initial Assessment, Treatment Day: Day of Assessment and 1st and AM 1/11/2019INPATIENT: Hospital Day: 3 [place] / Product Type: Medicare /  
  
NAME/AGE/GENDER: Arlene Wolff is a 66 y.o. female PRIMARY DIAGNOSIS:  Hip fracture ([facility] [address] [S72.009A] Hip fracture (HCC) Hip fracture ([facility] [address] Procedure(s) (LRB): 
RIGHT FEMUR INSERTION INTRA MEDULLARY NAIL WITH IM NAIL (Right) 1 Day Post-Op ICD-10: Treatment Diagnosis:  
 · Pain in Right Hip (M25.551) · Stiffness of Right Hip, Not elsewhere classified (M25.651) · History of falling (Z91.81) Precautions/Allergies: 
  Fall precautions Patient has no known allergies. ASSESSMENT:  
Ms. Taz Pedroza is a 66 y.o. female admitted after fall with R hip fracture. Pt now s/p R IM nailing and WBAT in E. Pt lives with  and reports independence with ADLs, functional mobility, and driving at baseline. Pt endorses history of falls. Pt found seated in chair and agreeable to OT evaluation and treatment upon arrival. BUE assessment revealed AROM WFL, strength 4+/5, sensation intact to light touch. Pt reports 4/10 pain at rest. Pt scooted forward in chair with SBA and completed sit to stand with Lakeshia and additional time. Treatment initiated to include functional mobility for ADLs and toileting. Pt practiced functional mobility to toilet with RW, Lakeshia to CGA and cueing for RW management. Pt required additional time for all mobility, as she inches her R foot forward rather than stepping. Pt practiced toilet transfer with Min to [address] [place] and additional time, and toileted with MaxA. Pt was dependent for bowel hygiene in standing, requiring BUEs for support on RW. Pt returned to chair with Lakeshia and RW and sat with Lakeshia for controlled descent. Pt's deficits include increased pain and stiffness in R hip, decreased activity tolerance, balance, functional transfer ability, and functional mobility, all of which negatively impact independence and safety with ADLs. Sheri Heath is currently functioning below her independent baseline for ADLs and would benefit from continued OT services to increase safety and independence. Will continue to follow. This section established at most recent assessment PROBLEM LIST (Impairments causing functional limitations): 1. Decreased Strength 2. Decreased ADL/Functional Activities 3. Decreased Transfer Abilities 4. Decreased Ambulation Ability/Technique 5. Decreased Balance 6. Increased Pain 7. Decreased Activity Tolerance 8. Increased Fatigue 9. Decreased Flexibility/Joint Mobility INTERVENTIONS PLANNED: (Benefits and precautions of occupational therapy have been discussed with the patient.) 1. Activities of daily living training 2. Adaptive equipment training 3. Balance training 4. Clothing management 5. Donning&doffing training 6. Hygiene training 7. Therapeutic activity 8. Therapeutic exercise TREATMENT PLAN: Frequency/Duration: Follow patient 6x/week to address above goals. Rehabilitation Potential For Stated Goals: Good RECOMMENDED REHABILITATION/EQUIPMENT: (at time of discharge pending progress): Due to the probability of continued deficits (see above) this patient will likely need continued skilled occupational therapy after discharge. Equipment: ? TBD  
    
 
 
 
OCCUPATIONAL PROFILE AND HISTORY:  
History of Present Injury/Illness (Reason for Referral): 
See H&P. Past Medical History/Comorbidities: Ms. Ester Ramírez  has no past medical history on file. Ms. Ester Ramírez  has no past surgical history on file. Social History/Living Environment:  
Home Environment: Private residence # Steps to Enter: 0 One/Two Story Residence: One story Living Alone: No 
Support Systems: Spouse/Significant Other/Partner Patient Expects to be Discharged to[de-identified] Rehabilitation facility Current DME Used/Available at Home: Tauna Corbin, straight, Walker, rolling, Wheelchair Tub or Shower Type: Tub/Shower combination Prior Level of Function/Work/Activity: 
Pt lives with  and reports independence with ADLs, functional mobility, and driving at baseline. Pt endorses history of falls. Dominant Side:  
      RIGHT Personal Factors:   
      Sex:  female Age:  66 y.o. Number of Personal Factors/Comorbidities that affect the Plan of Care: Brief history (0):  LOW COMPLEXITY ASSESSMENT OF OCCUPATIONAL PERFORMANCE[de-identified]  
Activities of Daily Living:  
Basic ADLs (From Assessment) Complex ADLs (From Assessment) Feeding: Independent Oral Facial Hygiene/Grooming: Setup Bathing: Moderate assistance Upper Body Dressing: Setup Lower Body Dressing: Total assistance Toileting: Maximum assistance Instrumental ADL Meal Preparation: Total assistance Homemaking: Total assistance Grooming/Bathing/Dressing Activities of Daily Living Cognitive Retraining Safety/Judgement: Awareness of environment; Fall prevention Toileting Toileting Assistance: Maximum assistance Bowel Hygiene: Total assistance (dependent) Clothing Management: Moderate assistance Adaptive Equipment: Leonel Adali Functional Transfers Bathroom Mobility: Minimum assistance Toilet Transfer : Minimum assistance; Moderate assistance Bed/Mat Mobility Supine to Sit: Maximum assistance Sit to Supine: (NT) Sit to Stand: Moderate assistance; Additional time Scooting: Stand-by assistance; Additional time Most Recent Physical Functioning:  
Gross Assessment: 
AROM: Within functional limits(BUEs) Strength: Within functional limits(BUEs) 4+/5 Coordination: Within functional limits(BUEs) Sensation: Intact(BUEs) Posture: 
  
Balance: 
Sitting: Intact Standing: Impaired; With support Standing - Static: Constant support;Good Standing - Dynamic : Fair Bed Mobility: 
Supine to Sit: Maximum assistance Sit to Supine: (NT) Scooting: Stand-by assistance; Additional time Wheelchair Mobility: 
  
Transfers: 
Sit to Stand: Moderate assistance; Additional time Stand to Sit: Minimum assistance; Additional time Patient Vitals for the past 6 hrs: 
 BP BP Patient Position SpO2 Pulse 19 0732 137/64 At rest 90 % 78 Mental Status Neurologic State: Alert Orientation Level: Appropriate for age Cognition: Appropriate decision making, Appropriate for age attention/concentration, Follows commands Perception: Appears intact Perseveration: No perseveration noted Safety/Judgement: Awareness of environment, Fall prevention Physical Skills Involved: 1. Range of Motion 2. Balance 3. Strength 4. Activity Tolerance 5. Pain (acute) Cognitive Skills Affected (resulting in the inability to perform in a timely and safe manner): 1. None Psychosocial Skills Affected: 1. Habits/Routines 2. Environmental Adaptation 3. Self-Awareness 4. Social Roles Number of elements that affect the Plan of Care: 5+:  HIGH COMPLEXITY CLINICAL DECISION MAKIN [facility] [address] [address] AM-PAC 6 Clicks Daily Activity Inpatient Short Form How much help from another person does the patient currently need. .. Total A Lot A Little None 1.  Putting on and taking off regular lower body clothing? [x] 1   [] 2   [] 3   [] 4  
2. Bathing (including washing, rinsing, drying)? [] 1   [x] 2   [] 3   [] 4  
3. Toileting, which includes using toilet, bedpan or urinal?   [] 1   [x] 2   [] 3   [] 4  
4. Putting on and taking off regular upper body clothing? [] 1   [] 2   [x] 3   [] 4  
5. Taking care of personal grooming such as brushing teeth? [] 1   [] 2   [x] 3   [] 4  
6. Eating meals? [] 1   [] 2   [] 3   [x] 4  
© 2007, Trustees of [address] [place] [address] [address] [address], under license to [employer]. All rights reserved Score:  Initial: 15  1/11/2019  Most Recent: X (Date: -- ) Interpretation of Tool:  Represents activities that are increasingly more difficult (i.e. Bed mobility, Transfers, Gait). Score 24 23 22-20 19-15 14-10 9-7 6 Modifier CH CI CJ CK CL CM CN   
 
? Self Care:  
  - CURRENT STATUS: CK - 40%-59% impaired, limited or restricted  - GOAL STATUS: CJ - 20%-39% impaired, limited or restricted  - D/C STATUS:  ---------------To be determined--------------- [place] / Product Type: Medicare /   
 
Medical Necessity:    
· Patient demonstrates good rehab potential due to higher previous functional level. Reason for Services/Other Comments: 
· Patient continues to require skilled intervention due to complete ADLs at prior level of independence. Use of outcome tool(s) and clinical judgement create a POC that gives a: MODERATE COMPLEXITY  
 
 
 
TREATMENT:  
(In addition to Assessment/Re-Assessment sessions the following treatments were rendered) Pre-treatment Symptoms/Complaints:   
Pain: Initial:  
Pain Intensity 1: 4 Pain Intervention(s) 1: Ambulation/Increased Activity, Repositioned  Post Session:  same Self Care: (15 minutes): Procedure(s) (per grid) utilized to improve and/or restore self-care/home management as related to toileting.  Required maximal verbal and manual cueing to facilitate activities of daily living skills and compensatory activities. Pt toileted with MaxA. Pt was dependent for bowel hygiene in standing, requiring BUEs for support on RW. Pt wiped hands in sitting with set up. Therapeutic Activity: (    30 minutes): Therapeutic activities including Chair transfers, Toilet transfers and Ambulation on level ground to improve mobility, strength, balance, coordination and activity toleracne. Required minimal Safety awareness training; Tactile cues; Verbal cues to promote dynamic balance in standing. Pt practiced functional mobility to toilet with RW, Lakeshia to CGA and cueing for RW management. Pt required additional time for all mobility, as she inches her R foot forward rather than stepping. Pt practiced toilet transfer with Min to [facility] and additional time. Pt returned to chair with Lakeshia and RW and sat with Lakeshia for controlled descent. Braces/Orthotics/Lines/Etc:  
· IV Treatment/Session Assessment:   
Response to Treatment:  Tolerated well · Interdisciplinary Collaboration:  
o Occupational Therapist 
o Certified Nursing Assistant/Patient Care Technician · After treatment position/precautions:  
o Up in chair 
o Bed alarm/tab alert on 
o Bed/Chair-wheels locked 
o Call light within reach 
o Visitors at bedside · Compliance with Program/Exercises: Compliant all of the time. · Recommendations/Intent for next treatment session: \"Next visit will focus on advancements to more challenging activities and reduction in assistance provided\". Total Treatment Duration: OT Patient Time In/Time Out Time In: 3161 Time Out: 1045 Linda Escobar OTR/JAM

## 2019-01-11 NOTE — PROGRESS NOTES
Patient accepted at [facility] for [facility]. Patient agreeable to facility. If medically cleared will be ready for transfer on 1-13-19.

## 2019-01-11 NOTE — PROGRESS NOTES
Problem: Mobility Impaired (Adult and Pediatric) Goal: *Acute Goals and Plan of Care (Insert Text) STG: 
(1.)Ms. Taz Pedroza will move from supine to sit and sit to supine  with MINIMAL ASSIST within 3 treatment day(s). (2.)Ms. Taz Pedroza will transfer from bed to chair and chair to bed with CONTACT GUARD ASSIST using the least restrictive device within 3 treatment day(s). (3.)Ms. Rivas will ambulate with CONTACT GUARD ASSIST for 50 feet with the least restrictive device within 3 treatment day(s). LTG: 
(1.)Ms. Taz Pedroza will move from supine to sit and sit to supine  in bed with STAND BY ASSIST within 7 treatment day(s). (2.)Ms. Taz Pedroza will transfer from bed to chair and chair to bed with STAND BY ASSIST using the least restrictive device within 7 treatment day(s). (3.)Ms. Rivas will ambulate with STAND BY ASSIST for 100+ feet with the least restrictive device within 7 treatment day(s). ________________________________________________________________________________________________ PHYSICAL THERAPY: Daily Note, Treatment Day: 1st, AM 1/11/2019INPATIENT: Hospital Day: 3 [place] / Product Type: Medicare /  
  
NAME/AGE/GENDER: Arlene Wolff is a 66 y.o. female PRIMARY DIAGNOSIS: Hip fracture ([facility] [address] [S72.009A] Hip fracture (HCC) Hip fracture ([facility] [address] Procedure(s) (LRB): 
RIGHT FEMUR INSERTION INTRA MEDULLARY NAIL WITH IM NAIL (Right) 1 Day Post-Op ICD-10: Treatment Diagnosis:  
 · Generalized Muscle Weakness (M62.81) · Difficulty in walking, Not elsewhere classified (R26.2) · History of falling (Z91.81) Precaution/Allergies: 
Patient has no known allergies. WBAT R LE 
  
ASSESSMENT:  
Ms. Taz Pedroza was supine upon contact and agreeable to PT. Patient requires additional time throughout treatment but does participate great and is motivated.  Patient performs supine to sit with max assist, additional time, and cues for improved/proper technique. Patient transfers to standing with mod assist and cues for hand placement. Once standing patient performs gait training x 8' with use of rolling walker, min assist, and below cues in gait section including cues for proper walker manipulation and sequencing with rolling walker. Patient needs manual assistance to advance RLE. Patient returns to recliner chair where she was left sitting up in recliner chair with PCT at bedside. Overall good progress towards physical therapy goals. No goals have been met thus far. Will continue efforts. This section established at most recent assessment PROBLEM LIST (Impairments causing functional limitations): 1. Decreased Strength 2. Decreased ADL/Functional Activities 3. Decreased Transfer Abilities 4. Decreased Ambulation Ability/Technique 5. Decreased Balance 6. Increased Pain 7. Decreased Activity Tolerance 8. Decreased Pacing Skills 9. Increased Fatigue 10. Increased Shortness of Breath 11. Decreased Flexibility/Joint Mobility 12. Decreased Knowledge of Precautions 13. Decreased [place] with Home Exercise Program 
 INTERVENTIONS PLANNED: (Benefits and precautions of physical therapy have been discussed with the patient.) 1. Balance Exercise 2. Bed Mobility 3. Family Education 4. Gait Training 5. Home Exercise Program (HEP) 6. Neuromuscular Re-education/Strengthening 7. Range of Motion (ROM) 8. Therapeutic Activites 9. Therapeutic Exercise/Strengthening 10. Transfer Training TREATMENT PLAN: Frequency/Duration: twice daily for duration of hospital stay Rehabilitation Potential For Stated Goals: Good RECOMMENDED REHABILITATION/EQUIPMENT: (at time of discharge pending progress): Due to the probability of continued deficits (see above) this patient will likely need continued skilled physical therapy after discharge. Equipment:  
? None at this time HISTORY:  
 History of Present Injury/Illness (Reason for Referral): S/p RIGHT FEMUR INSERTION INTRA MEDULLARY NAIL WITH IM NAIL Past Medical History/Comorbidities: Ms. William Walker  has no past medical history on file. Ms. William Walker  has no past surgical history on file. Social History/Living Environment:  
Home Environment: Private residence # Steps to Enter: 0 One/Two Story Residence: One story Living Alone: No 
Support Systems: Spouse/Significant Other/Partner Patient Expects to be Discharged to[de-identified] Rehabilitation facility Current DME Used/Available at Home: Cane, straight Prior Level of Function/Work/Activity: 
Independent with mobility, 1 fall Number of Personal Factors/Comorbidities that affect the Plan of Care: 1-2: MODERATE COMPLEXITY EXAMINATION:  
Most Recent Physical Functioning:  
Gross Assessment: 
  
         
  
Posture: 
  
Balance: 
Sitting: Intact Standing: Impaired Standing - Static: Constant support;Good Standing - Dynamic : Fair Bed Mobility: 
Supine to Sit: Maximum assistance Sit to Supine: (NT) Wheelchair Mobility: 
  
Transfers: 
Sit to Stand: Moderate assistance Stand to Sit: Minimum assistance Gait: 
Right Side Weight Bearing: As tolerated Base of Support: Center of gravity altered;Shift to left Speed/[place]: Shuffled; Slow Step Length: Right shortened;Left shortened Gait Abnormalities: Decreased step clearance; Step to gait; Antalgic Distance (ft): 8 Feet (ft) Assistive Device: Walker, rolling Ambulation - Level of Assistance: Minimal assistance Interventions: Safety awareness training; Tactile cues; Verbal cues Body Structures Involved: 1. Nerves 2. Heart 3. Lungs 4. Bones 5. Joints 6. Muscles 7. Ligaments Body Functions Affected: 1. Sensory/Pain 2. Cardio 3. Respiratory 4. Neuromusculoskeletal 
5. Movement Related Activities and Participation Affected: 1. General Tasks and Demands 2. Mobility 3. Self Care 4. Domestic Life 5. Interpersonal Interactions and Relationships 6. Community, Social and [place] Number of elements that affect the Plan of Care: 4+: HIGH COMPLEXITY CLINICAL PRESENTATION:  
Presentation: Evolving clinical presentation with changing clinical characteristics: MODERATE COMPLEXITY CLINICAL DECISION MAKING:  
[facility] AM-PAC 6 Clicks Basic Mobility Inpatient Short Form How much difficulty does the patient currently have. .. Unable A Lot A Little None 1. Turning over in bed (including adjusting bedclothes, sheets and blankets)? [] 1   [x] 2   [] 3   [] 4  
2. Sitting down on and standing up from a chair with arms ( e.g., wheelchair, bedside commode, etc.)   [] 1   [] 2   [x] 3   [] 4  
3. Moving from lying on back to sitting on the side of the bed? [] 1   [x] 2   [] 3   [] 4 How much help from another person does the patient currently need. .. Total A Lot A Little None 4. Moving to and from a bed to a chair (including a wheelchair)? [] 1   [] 2   [x] 3   [] 4  
5. Need to walk in hospital room? [] 1   [x] 2   [] 3   [] 4  
6. Climbing 3-5 steps with a railing? [x] 1   [] 2   [] 3   [] 4  
© 2007, Trustees of [facility], under license to [facility]. All rights reserved Score:  Initial: 13 Most Recent: X (Date: -- ) Interpretation of Tool:  Represents activities that are increasingly more difficult (i.e. Bed mobility, Transfers, Gait). Score 24 23 22-20 19-15 14-10 9-7 6 Modifier CH CI CJ CK CL CM CN   
 
? Mobility - Walking and Moving Around:  
  - CURRENT STATUS: CL - 60%-79% impaired, limited or restricted  - GOAL STATUS: CK - 40%-59% impaired, limited or restricted  - D/C STATUS:  ---------------To be determined--------------- [place] / Product Type: Medicare /   
 
Medical Necessity:    
· Patient is expected to demonstrate progress in strength, range of motion, balance, coordination and functional technique to decrease assistance required with gait, transfers, and functional mobility. Katia Rodney Reason for Services/Other Comments: 
· Patient continues to require skilled intervention due to decreased strength, decreased balance, decreased functional tolerance, decreased cardiopulmonary endurance affecting participation in basic ADLs and functional tasks. Use of outcome tool(s) and clinical judgement create a POC that gives a: Clear prediction of patient's progress: LOW COMPLEXITY  
  
 
 
 
TREATMENT:  
(In addition to Assessment/Re-Assessment sessions the following treatments were rendered) Pre-treatment Symptoms/Complaints:  none Pain: Initial:  
Pain Intensity 1: 0  Post Session: no Therapeutic Activity: (    15 minutes): Therapeutic activities including bed mobility training, transfer training, static/dynamic sitting/standing balance training, scooting, instruction in sequencing with rolling walker, posture training, and patient education to improve mobility, strength, balance and coordination. Required moderate Safety awareness training; Tactile cues; Verbal cues to promote static and dynamic balance in standing and promote coordination of bilateral, upper extremity(s), lower extremity(s). Gait Training (  10 Minutes):  Gait training to improve and/or restore physical functioning as related to mobility, strength and balance. Ambulated 8 Feet (ft) with Minimal assistance using a Walker, rolling and moderate Safety awareness training; Tactile cues; Verbal cues related to their sequencing, weight shifts, walker manipulation, UE support on rolling walker, foot placement, and step length to promote proper body alignment, promote proper body posture and promote proper body mechanics. Instruction in performance of the above deficits to correct overall gait quality and functional mobility. Braces/Orthotics/Lines/Etc:  
· IV 
· O2 Device: Nasal cannula Treatment/Session Assessment:   
· Response to Treatment:  See above · Interdisciplinary Collaboration:  
o Physical Therapy Assistant 
o Registered Nurse · After treatment position/precautions:  
o Up in chair 
o Bed alarm/tab alert on 
o Bed/Chair-wheels locked 
o Call light within reach 
o RN notified 
o PCT at bedside · Compliance with Program/Exercises: Will assess as treatment progresses · Recommendations/Intent for next treatment session: \"Next visit will focus on advancements to more challenging activities and reduction in assistance provided\". Total Treatment Duration: PT Patient Time In/Time Out Time In: 0845 Time Out: 2059 Juany Villagran, PTA

## 2019-01-11 NOTE — PROGRESS NOTES
Problem: Falls - Risk of 
Goal: *Absence of Falls Document Ximena Bustillos Fall Risk and appropriate interventions in the flowsheet. Outcome: Progressing Towards Goal 
Fall Risk Interventions: 
Mobility Interventions: Bed/chair exit alarm, OT consult for ADLs, Patient to call before getting OOB, PT Consult for mobility concerns Medication Interventions: Bed/chair exit alarm, Evaluate medications/consider consulting pharmacy, Patient to call before getting OOB Elimination Interventions: Call light in reach, Bed/chair exit alarm, Patient to call for help with toileting needs History of Falls Interventions: Bed/chair exit alarm, Door open when patient unattended, Investigate reason for fall Problem: Pressure Injury - Risk of 
Goal: *Prevention of pressure injury Document Kevin Scale and appropriate interventions in the flowsheet. Outcome: Progressing Towards Goal 
Pressure Injury Interventions: Activity Interventions: Increase time out of bed, Pressure redistribution bed/mattress(bed type), PT/OT evaluation Mobility Interventions: HOB 30 degrees or less, Pressure redistribution bed/mattress (bed type), PT/OT evaluation Nutrition Interventions: Document food/fluid/supplement intake, Offer support with meals,snacks and hydration

## 2019-01-12 LAB
BASOPHILS # BLD: 0 K/UL (ref 0–0.2)
BASOPHILS NFR BLD: 1 % (ref 0–2)
DIFFERENTIAL METHOD BLD: ABNORMAL
EOSINOPHIL # BLD: 0.4 K/UL (ref 0–0.8)
EOSINOPHIL NFR BLD: 4 % (ref 0.5–7.8)
ERYTHROCYTE [DISTWIDTH] IN BLOOD BY AUTOMATED COUNT: 14.5 % (ref 11.9–14.6)
HCT VFR BLD AUTO: 28.7 % (ref 35.8–46.3)
HGB BLD-MCNC: 9.3 G/DL (ref 11.7–15.4)
IMM GRANULOCYTES # BLD AUTO: 0 K/UL (ref 0–0.5)
IMM GRANULOCYTES NFR BLD AUTO: 1 % (ref 0–5)
LYMPHOCYTES # BLD: 1.5 K/UL (ref 0.5–4.6)
LYMPHOCYTES NFR BLD: 18 % (ref 13–44)
MCH RBC QN AUTO: 29.8 PG (ref 26.1–32.9)
MCHC RBC AUTO-ENTMCNC: 32.4 G/DL (ref 31.4–35)
MCV RBC AUTO: 92 FL (ref 79.6–97.8)
MM INDURATION POC: 0 MM (ref 0–5)
MONOCYTES # BLD: 0.7 K/UL (ref 0.1–1.3)
MONOCYTES NFR BLD: 9 % (ref 4–12)
NEUTS SEG # BLD: 5.6 K/UL (ref 1.7–8.2)
NEUTS SEG NFR BLD: 68 % (ref 43–78)
NRBC # BLD: 0 K/UL (ref 0–0.2)
PLATELET # BLD AUTO: 154 K/UL (ref 150–450)
PMV BLD AUTO: 11.1 FL (ref 9.4–12.3)
PPD POC: NEGATIVE NEGATIVE
RBC # BLD AUTO: 3.12 M/UL (ref 4.05–5.2)
WBC # BLD AUTO: 8.2 K/UL (ref 4.3–11.1)

## 2019-01-12 PROCEDURE — 74011250637 HC RX REV CODE- 250/637: Performed by: NURSE PRACTITIONER

## 2019-01-12 PROCEDURE — 99218 HC RM OBSERVATION: CPT

## 2019-01-12 PROCEDURE — 97110 THERAPEUTIC EXERCISES: CPT

## 2019-01-12 PROCEDURE — 65270000029 HC RM PRIVATE

## 2019-01-12 PROCEDURE — 36415 COLL VENOUS BLD VENIPUNCTURE: CPT

## 2019-01-12 PROCEDURE — 97530 THERAPEUTIC ACTIVITIES: CPT

## 2019-01-12 PROCEDURE — 96372 THER/PROPH/DIAG INJ SC/IM: CPT

## 2019-01-12 PROCEDURE — 97116 GAIT TRAINING THERAPY: CPT

## 2019-01-12 PROCEDURE — 85025 COMPLETE CBC W/AUTO DIFF WBC: CPT

## 2019-01-12 PROCEDURE — 74011250636 HC RX REV CODE- 250/636: Performed by: NURSE PRACTITIONER

## 2019-01-12 RX ADMIN — CALCIUM CARBONATE 500 MG (1,250 MG)-VITAMIN D3 200 UNIT TABLET 1 TABLET: at 17:41

## 2019-01-12 RX ADMIN — Medication 5 ML: at 21:45

## 2019-01-12 RX ADMIN — ACETAMINOPHEN 650 MG: 325 TABLET ORAL at 05:27

## 2019-01-12 RX ADMIN — Medication 10 ML: at 14:47

## 2019-01-12 RX ADMIN — CALCIUM CARBONATE 500 MG (1,250 MG)-VITAMIN D3 200 UNIT TABLET 1 TABLET: at 11:56

## 2019-01-12 RX ADMIN — OXYCODONE HYDROCHLORIDE 5 MG: 5 TABLET ORAL at 13:26

## 2019-01-12 RX ADMIN — Medication 5 ML: at 05:27

## 2019-01-12 RX ADMIN — ACETAMINOPHEN 650 MG: 325 TABLET ORAL at 13:26

## 2019-01-12 RX ADMIN — DOCUSATE SODIUM 100 MG: 100 CAPSULE, LIQUID FILLED ORAL at 08:27

## 2019-01-12 RX ADMIN — CALCIUM CARBONATE 500 MG (1,250 MG)-VITAMIN D3 200 UNIT TABLET 1 TABLET: at 08:27

## 2019-01-12 RX ADMIN — OXYCODONE HYDROCHLORIDE 5 MG: 5 TABLET ORAL at 08:45

## 2019-01-12 RX ADMIN — DOCUSATE SODIUM 100 MG: 100 CAPSULE, LIQUID FILLED ORAL at 17:41

## 2019-01-12 RX ADMIN — OXYCODONE HYDROCHLORIDE 5 MG: 5 TABLET ORAL at 21:44

## 2019-01-12 RX ADMIN — ENOXAPARIN SODIUM 40 MG: 40 INJECTION SUBCUTANEOUS at 13:26

## 2019-01-12 RX ADMIN — ACETAMINOPHEN 650 MG: 325 TABLET ORAL at 21:44

## 2019-01-12 NOTE — PROGRESS NOTES
Problem: Mobility Impaired (Adult and Pediatric) Goal: *Acute Goals and Plan of Care (Insert Text) STG: 
(1.)Ms. Alyson Nicole will move from supine to sit and sit to supine  with MINIMAL ASSIST within 3 treatment day(s). (2.)Ms. Alyson Nicole will transfer from bed to chair and chair to bed with CONTACT GUARD ASSIST using the least restrictive device within 3 treatment day(s). (3.)Ms. Rivas will ambulate with CONTACT GUARD ASSIST for 50 feet with the least restrictive device within 3 treatment day(s). LTG: 
(1.)Ms. Alyson Nicole will move from supine to sit and sit to supine  in bed with STAND BY ASSIST within 7 treatment day(s). (2.)Ms. Alyson Nicole will transfer from bed to chair and chair to bed with STAND BY ASSIST using the least restrictive device within 7 treatment day(s). (3.)Ms. Rivas will ambulate with STAND BY ASSIST for 100+ feet with the least restrictive device within 7 treatment day(s). ________________________________________________________________________________________________ PHYSICAL THERAPY: Daily Note, Treatment Day: 2nd, PM 1/12/2019INPATIENT: Hospital Day: 4 [place] / Product Type: Medicare /  
  
NAME/AGE/GENDER: Beth House is a 66 y.o. female PRIMARY DIAGNOSIS: Hip fracture ([address] [S72.009A] Hip fracture (HCC) Hip fracture ([address] Procedure(s) (LRB): 
RIGHT FEMUR INSERTION INTRA MEDULLARY NAIL WITH IM NAIL (Right) 2 Days Post-Op ICD-10: Treatment Diagnosis:  
 · Generalized Muscle Weakness (M62.81) · Difficulty in walking, Not elsewhere classified (R26.2) · History of falling (Z91.81) Precaution/Allergies: 
Patient has no known allergies. WBAT R LE 
  
ASSESSMENT:  
Ms. Alyson Nicole was supine and ready to participate. She performed supine exercises well with some assistance and sat up with moderate assist but much immproved.   She stood into the walker with minimal assist and cues for technique and got to commode with CGA. After using the commode she was able to walk about 8 feet to the other side of the bed with much improved weight shift and R leg forwarding. Good progress with all mobility. PM:  Patient stood from the chair and took a few steps to the commode. She was able to clean herself then stand from the commode with CGA and pull up her underwear. She was able to walk about 8 feet around the bed to the other side and needed moderate assist to lay down. Good steady progress. This section established at most recent assessment PROBLEM LIST (Impairments causing functional limitations): 1. Decreased Strength 2. Decreased ADL/Functional Activities 3. Decreased Transfer Abilities 4. Decreased Ambulation Ability/Technique 5. Decreased Balance 6. Increased Pain 7. Decreased Activity Tolerance 8. Decreased Pacing Skills 9. Increased Fatigue 10. Increased Shortness of Breath 11. Decreased Flexibility/Joint Mobility 12. Decreased Knowledge of Precautions 13. Decreased [place] with Home Exercise Program 
 INTERVENTIONS PLANNED: (Benefits and precautions of physical therapy have been discussed with the patient.) 1. Balance Exercise 2. Bed Mobility 3. Family Education 4. Gait Training 5. Home Exercise Program (HEP) 6. Neuromuscular Re-education/Strengthening 7. Range of Motion (ROM) 8. Therapeutic Activites 9. Therapeutic Exercise/Strengthening 10. Transfer Training TREATMENT PLAN: Frequency/Duration: twice daily for duration of hospital stay Rehabilitation Potential For Stated Goals: Good RECOMMENDED REHABILITATION/EQUIPMENT: (at time of discharge pending progress): Due to the probability of continued deficits (see above) this patient will likely need continued skilled physical therapy after discharge. Equipment:  
? None at this time HISTORY:  
History of Present Injury/Illness (Reason for Referral): 
 S/p RIGHT FEMUR INSERTION INTRA MEDULLARY NAIL WITH IM NAIL Past Medical History/Comorbidities: Ms. Jose Linares  has no past medical history on file. Ms. Jose Linares  has a past surgical history that includes RIGHT FEMUR INSERTION INTRA MEDULLARY NAIL WITH IM NAIL (Right, 1/10/2019). Social History/Living Environment:  
Home Environment: Private residence # Steps to Enter: 0 One/Two Story Residence: One story Living Alone: No 
Support Systems: Spouse/Significant Other/Partner Patient Expects to be Discharged to[de-identified] Rehabilitation facility Current DME Used/Available at Home: Dietra Hives, straight, Walker, rolling, Wheelchair Tub or Shower Type: Tub/Shower combination Prior Level of Function/Work/Activity: 
Independent with mobility, 1 fall Number of Personal Factors/Comorbidities that affect the Plan of Care: 1-2: MODERATE COMPLEXITY EXAMINATION:  
Most Recent Physical Functioning:  
Gross Assessment: 
  
         
  
Posture: 
  
Balance: 
  Bed Mobility: 
Supine to Sit: Moderate assistance Sit to Supine: Moderate assistance Wheelchair Mobility: 
  
Transfers: 
Sit to Stand: Contact guard assistance Stand to Sit: Contact guard assistance Gait: 
  
Speed/[place]: Slow Gait Abnormalities: Antalgic;Decreased step clearance; Step to gait Distance (ft): 8 Feet (ft) Assistive Device: Walker, rolling Ambulation - Level of Assistance: Contact guard assistance Body Structures Involved: 1. Nerves 2. Heart 3. Lungs 4. Bones 5. Joints 6. Muscles 7. Ligaments Body Functions Affected: 1. Sensory/Pain 2. Cardio 3. Respiratory 4. Neuromusculoskeletal 
5. Movement Related Activities and Participation Affected: 1. General Tasks and Demands 2. Mobility 3. Self Care 4. Domestic Life 5. Interpersonal Interactions and Relationships 6. Community, Social and [place] Number of elements that affect the Plan of Care: 4+: HIGH COMPLEXITY CLINICAL PRESENTATION:  
 Presentation: Evolving clinical presentation with changing clinical characteristics: MODERATE COMPLEXITY CLINICAL DECISION MAKING:  
Josie Daniel AM-PAC 6 Clicks Basic Mobility Inpatient Short Form How much difficulty does the patient currently have. .. Unable A Lot A Little None 1. Turning over in bed (including adjusting bedclothes, sheets and blankets)? [] 1   [x] 2   [] 3   [] 4  
2. Sitting down on and standing up from a chair with arms ( e.g., wheelchair, bedside commode, etc.)   [] 1   [] 2   [x] 3   [] 4  
3. Moving from lying on back to sitting on the side of the bed? [] 1   [x] 2   [] 3   [] 4 How much help from another person does the patient currently need. .. Total A Lot A Little None 4. Moving to and from a bed to a chair (including a wheelchair)? [] 1   [] 2   [x] 3   [] 4  
5. Need to walk in hospital room? [] 1   [x] 2   [] 3   [] 4  
6. Climbing 3-5 steps with a railing? [x] 1   [] 2   [] 3   [] 4  
© 2007, Trustees of [employer], under license to [employer]. All rights reserved Score:  Initial: 13 Most Recent: X (Date: -- ) Interpretation of Tool:  Represents activities that are increasingly more difficult (i.e. Bed mobility, Transfers, Gait). Score 24 23 22-20 19-15 14-10 9-7 6 Modifier CH CI CJ CK CL CM CN   
 
? Mobility - Walking and Moving Around:  
  - CURRENT STATUS: CL - 60%-79% impaired, limited or restricted  - GOAL STATUS: CK - 40%-59% impaired, limited or restricted  - D/C STATUS:  ---------------To be determined--------------- [place] / [place] B / Product Type: Medicare /   
 
Medical Necessity:    
· Patient is expected to demonstrate progress in strength, range of motion, balance, coordination and functional technique to decrease assistance required with gait, transfers, and functional mobility. Som [place] Reason for Services/Other Comments: · Patient continues to require skilled intervention due to decreased strength, decreased balance, decreased functional tolerance, decreased cardiopulmonary endurance affecting participation in basic ADLs and functional tasks. Use of outcome tool(s) and clinical judgement create a POC that gives a: Clear prediction of patient's progress: LOW COMPLEXITY  
  
 
 
 
TREATMENT:  
(In addition to Assessment/Re-Assessment sessions the following treatments were rendered) Pre-treatment Symptoms/Complaints:  none Pain: Initial:  
Pain Intensity 1: 3  Post Session: no Therapeutic Activity: (    25 minutes): Therapeutic activities including bed mobility training, transfer training from various surface heights, commode transfers and ambulation to improve mobility, strength, balance and coordination. Required minimal assist   to promote static and dynamic balance in standing. Date: 
1/12/19 Date: 
 Date: Activity/Exercise Parameters Parameters Parameters Supine heel slides 10x B AAR Supine SAQ 10x B AAR Supine quad sets 10x R Supine hip abd 10x B AAR Braces/Orthotics/Lines/Etc:  
· none Treatment/Session Assessment:   
· Response to Treatment:  See above · Interdisciplinary Collaboration:  
o Physical Therapy Assistant 
o Registered Nurse · After treatment position/precautions:  
o Up in chair 
o Bed alarm/tab alert on 
o Bed/Chair-wheels locked 
o Bed in low position 
o Call light within reach 
o RN notified 
o Family at bedside · Compliance with Program/Exercises: Compliant all of the time · Recommendations/Intent for next treatment session: \"Next visit will focus on advancements to more challenging activities and reduction in assistance provided\". Total Treatment Duration: PT Patient Time In/Time Out Time In: 8609 Time Out: 1440 Avelina Cosme PTA

## 2019-01-12 NOTE — PROGRESS NOTES
Problem: Falls - Risk of 
Goal: *Absence of Falls Document Edson Perkins Fall Risk and appropriate interventions in the flowsheet. Outcome: Progressing Towards Goal 
Fall Risk Interventions: 
Mobility Interventions: Bed/chair exit alarm, OT consult for ADLs, Patient to call before getting OOB, PT Consult for mobility concerns Medication Interventions: Evaluate medications/consider consulting pharmacy, Bed/chair exit alarm, Patient to call before getting OOB Elimination Interventions: Call light in reach, Bed/chair exit alarm, Patient to call for help with toileting needs History of Falls Interventions: Bed/chair exit alarm, Door open when patient unattended, Investigate reason for fall Problem: Pressure Injury - Risk of 
Goal: *Prevention of pressure injury Document Kevin Scale and appropriate interventions in the flowsheet. Outcome: Progressing Towards Goal 
Pressure Injury Interventions: Activity Interventions: Increase time out of bed, Pressure redistribution bed/mattress(bed type), PT/OT evaluation Mobility Interventions: HOB 30 degrees or less, Pressure redistribution bed/mattress (bed type), PT/OT evaluation Nutrition Interventions: Document food/fluid/supplement intake, Offer support with meals,snacks and hydration

## 2019-01-12 NOTE — PROGRESS NOTES
Hospitalist Progress Note Subjective:  
Daily Progress Note: 1/12/2019 9:07 AM 
 
Patient presented to ER 1/9 after she tripped and fell at [employer] [facility], landing on her right hip. She was unable to rise after incident due to pain in right hip. No additional injury or LOC. Found with a comminuted intercondylar fracture of the right femur. ER MD spoke with Dr Jose Valverde, who agrees to see patient. Admitted by Hospitalist, Ortho fracture with plans for OR 1/10. She is extremely healthy and takes no meds at home. 1/10:  Hgb 10.1. Underwent right femoral medullary nail insertion per Dr Jose Valverde under SAB abd tolerated well. Stable vitals, no nausea and well controlled pain post op. Up in chair post op with PT.  
 1/11:  Postop day 1. Up in chair, doing well. Pain controlled, eating, drinking and voiding. Hbg: 10.1. Planning for rehab.  at bedside.  
  
1/12:   Post op day 2. Hgb 9.3. Up in chair. Unsteady gait, otherwise doing well. Family at bedside. Passing gas ADDITIONAL HISTORY:  Mild osteoporosis, [place] 
  
Current Facility-Administered Medications Medication Dose Route Frequency  traMADol (ULTRAM) tablet 50 mg  50 mg Oral Q6H PRN  
 lactated Ringers infusion  75 mL/hr IntraVENous CONTINUOUS  
 sodium chloride (NS) flush 5-40 mL  5-40 mL IntraVENous Q8H  
 sodium chloride (NS) flush 5-40 mL  5-40 mL IntraVENous PRN  
 acetaminophen (TYLENOL) tablet 650 mg  650 mg Oral Q8H  
 oxyCODONE IR (ROXICODONE) tablet 5 mg  5 mg Oral Q4H PRN  
 ondansetron (ZOFRAN) injection 4 mg  4 mg IntraVENous Q4H PRN  
 docusate sodium (COLACE) capsule 100 mg  100 mg Oral BID  alum-mag hydroxide-simeth (MYLANTA) oral suspension 30 mL  30 mL Oral Q4H PRN  
 calcium-vitamin D (OS-AGUSTINA) 500 mg-200 unit tablet  1 Tab Oral TID WITH MEALS  enoxaparin (LOVENOX) injection 40 mg  40 mg SubCUTAneous Q24H Review of Systems A comprehensive review of systems was negative except for that written in the HPI. Objective:  
 
Visit Vitals /69 (BP 1 Location: Right arm, BP Patient Position: At rest) Pulse 75 Temp 98.3 °F (36.8 °C) Resp 18 Ht 5' 5\" (1.651 m) Wt 77.1 kg (170 lb) SpO2 96% BMI 28.29 kg/m² O2 Flow Rate (L/min): 2 l/min O2 Device: Nasal cannula Temp (24hrs), Av.4 °F (36.9 °C), Min:97.8 °F (36.6 °C), Max:98.7 °F (37.1 °C) No intake/output data recorded. 01/10 1901 -  0700 In: 360 [P.O.:360] Out: 1100 [Urine:1100] General appearance:  Oriented and alert, cooperative,up in chair, doing well.  voiding. Head: Normocephalic, without obvious abnormality, atraumatic Throat: Lips, mucosa, and tongue normal. Teeth and gums normal 
Neck: supple, symmetrical, trachea midline, no adenopathy, no JVD Lungs: clear to auscultation bilaterally Heart: regular rate and rhythm, S1, S2 normal, no murmur, click, rub or gallop Abdomen: soft, non-tender. Bowel sounds normal. No masses,  no organomegaly. Eating, drinking, voiding. Passing flatus. Extremities: Dry intact dressing to right hip extremities normal, atraumatic, no cyanosis or edema Skin: Skin color, texture, turgor normal. No rashes or lesions Neurologic: Grossly normal 
  
Additional comments: Notes,orders, test results, vitals reviewed Data Review Recent Results (from the past 24 hour(s)) PLEASE READ & DOCUMENT PPD TEST IN 48 HRS Collection Time: 19  1:28 AM  
Result Value Ref Range PPD negative Negative  
 mm Induration 0 mm CBC WITH AUTOMATED DIFF Collection Time: 19  6:11 AM  
Result Value Ref Range WBC 8.2 4.3 - 11.1 K/uL  
 RBC 3.12 (L) 4.05 - 5.2 M/uL HGB 9.3 (L) 11.7 - 15.4 g/dL HCT 28.7 (L) 35.8 - 46.3 % MCV 92.0 79.6 - 97.8 FL  
 MCH 29.8 26.1 - 32.9 PG  
 MCHC 32.4 31.4 - 35.0 g/dL  
 RDW 14.5 11.9 - 14.6 % PLATELET 481 221 - 454 K/uL MPV 11.1 9.4 - 12.3 FL ABSOLUTE NRBC 0.00 0.0 - 0.2 K/uL  
 DF AUTOMATED NEUTROPHILS 68 43 - 78 % LYMPHOCYTES 18 13 - 44 % MONOCYTES 9 4.0 - 12.0 % EOSINOPHILS 4 0.5 - 7.8 % BASOPHILS 1 0.0 - 2.0 % IMMATURE GRANULOCYTES 1 0.0 - 5.0 %  
 ABS. NEUTROPHILS 5.6 1.7 - 8.2 K/UL  
 ABS. LYMPHOCYTES 1.5 0.5 - 4.6 K/UL  
 ABS. MONOCYTES 0.7 0.1 - 1.3 K/UL  
 ABS. EOSINOPHILS 0.4 0.0 - 0.8 K/UL  
 ABS. BASOPHILS 0.0 0.0 - 0.2 K/UL  
 ABS. IMM. GRANS. 0.0 0.0 - 0.5 K/UL  
 
 RIGHT HIP:  Comminuted intercondylar fracture. The acetabulum and hip joint 
are intact. No other fractures are identified. IMPRESSION: : Minimal linear atelectasis of the right lower lobe. 
  
RIGHT FEMUR: views of the right femur demonstrate a comminuted intercondylar fracture. The femoral head and acetabulum are intact. The mid and distal femur are intact. Luis Lopes 
IMPRESSION: Comminuted intercondylar fracture. 
  
 CXR:  : Minimal linear atelectasis of the right lower lobe. Assessment/Plan:  
Mechanical fall with right hip trauma Right Hip fracture S/P right femoral intra medullary nail insertion Ortho fracture center routine orders Plans for rehab on discharge Osteopenia Hard of hearing [facility] to discharge to [address] [place] [address] Monday 1/14 Care Plan discussed with: Patient/Family and Nurse Signed By: Chikis Ly NP   
 January 12, 2019

## 2019-01-12 NOTE — PROGRESS NOTES
Problem: Mobility Impaired (Adult and Pediatric) Goal: *Acute Goals and Plan of Care (Insert Text) STG: 
(1.)Ms. Paula Davenport will move from supine to sit and sit to supine  with MINIMAL ASSIST within 3 treatment day(s). (2.)Ms. Paula Davenport will transfer from bed to chair and chair to bed with CONTACT GUARD ASSIST using the least restrictive device within 3 treatment day(s). (3.)Ms. Rivas will ambulate with CONTACT GUARD ASSIST for 50 feet with the least restrictive device within 3 treatment day(s). LTG: 
(1.)Ms. Paula Davenport will move from supine to sit and sit to supine  in bed with STAND BY ASSIST within 7 treatment day(s). (2.)Ms. Paula Davenport will transfer from bed to chair and chair to bed with STAND BY ASSIST using the least restrictive device within 7 treatment day(s). (3.)Ms. Rivas will ambulate with STAND BY ASSIST for 100+ feet with the least restrictive device within 7 treatment day(s). ________________________________________________________________________________________________ PHYSICAL THERAPY: Daily Note, Treatment Day: 2nd, AM 1/12/2019INPATIENT: Hospital Day: 4 [place] / Product Type: Medicare /  
  
NAME/AGE/GENDER: Deepa Milton is a 66 y.o. female PRIMARY DIAGNOSIS: Hip fracture ([facility] [S72.009A] Hip fracture (HCC) Hip fracture ([facility] [address] Procedure(s) (LRB): 
RIGHT FEMUR INSERTION INTRA MEDULLARY NAIL WITH IM NAIL (Right) 2 Days Post-Op ICD-10: Treatment Diagnosis:  
 · Generalized Muscle Weakness (M62.81) · Difficulty in walking, Not elsewhere classified (R26.2) · History of falling (Z91.81) Precaution/Allergies: 
Patient has no known allergies. WBAT R LE 
  
ASSESSMENT:  
Ms. Paula Davenport was supine and ready to participate. She performed supine exercises well with some assistance and sat up with moderate assist but much immproved.   She stood into the walker with minimal assist and cues for technique and got to commode with CGA. After using the commode she was able to walk about 8 feet to the other side of the bed with much improved weight shift and R leg forwarding. Good progress with all mobility. This section established at most recent assessment PROBLEM LIST (Impairments causing functional limitations): 1. Decreased Strength 2. Decreased ADL/Functional Activities 3. Decreased Transfer Abilities 4. Decreased Ambulation Ability/Technique 5. Decreased Balance 6. Increased Pain 7. Decreased Activity Tolerance 8. Decreased Pacing Skills 9. Increased Fatigue 10. Increased Shortness of Breath 11. Decreased Flexibility/Joint Mobility 12. Decreased Knowledge of Precautions 13. Decreased [place] with Home Exercise Program 
 INTERVENTIONS PLANNED: (Benefits and precautions of physical therapy have been discussed with the patient.) 1. Balance Exercise 2. Bed Mobility 3. Family Education 4. Gait Training 5. Home Exercise Program (HEP) 6. Neuromuscular Re-education/Strengthening 7. Range of Motion (ROM) 8. Therapeutic Activites 9. Therapeutic Exercise/Strengthening 10. Transfer Training TREATMENT PLAN: Frequency/Duration: twice daily for duration of hospital stay Rehabilitation Potential For Stated Goals: Good RECOMMENDED REHABILITATION/EQUIPMENT: (at time of discharge pending progress): Due to the probability of continued deficits (see above) this patient will likely need continued skilled physical therapy after discharge. Equipment:  
? None at this time HISTORY:  
History of Present Injury/Illness (Reason for Referral): S/p RIGHT FEMUR INSERTION INTRA MEDULLARY NAIL WITH IM NAIL Past Medical History/Comorbidities: Ms. Ester Ramírez  has no past medical history on file. Ms. Ester Ramírez  has a past surgical history that includes RIGHT FEMUR INSERTION INTRA MEDULLARY NAIL WITH IM NAIL (Right, 1/10/2019). Social History/Living Environment: Home Environment: Private residence # Steps to Enter: 0 One/Two Story Residence: One story Living Alone: No 
Support Systems: Spouse/Significant Other/Partner Patient Expects to be Discharged to[de-identified] Rehabilitation facility Current DME Used/Available at Home: [address], [place], straight, Walker, rolling, Wheelchair Tub or Shower Type: Tub/Shower combination Prior Level of Function/Work/Activity: 
Independent with mobility, 1 fall Number of Personal Factors/Comorbidities that affect the Plan of Care: 1-2: MODERATE COMPLEXITY EXAMINATION:  
Most Recent Physical Functioning:  
Gross Assessment: 
  
         
  
Posture: 
  
Balance: 
  Bed Mobility: 
Supine to Sit: Moderate assistance Wheelchair Mobility: 
  
Transfers: 
Sit to Stand: Contact guard assistance;Minimum assistance Stand to Sit: Contact guard assistance Gait: 
  
Speed/[place]: Slow Gait Abnormalities: Antalgic;Decreased step clearance; Step to gait Distance (ft): 8 Feet (ft) Assistive Device: Walker, rolling Ambulation - Level of Assistance: Contact guard assistance Body Structures Involved: 1. Nerves 2. Heart 3. Lungs 4. Bones 5. Joints 6. Muscles 7. Ligaments Body Functions Affected: 1. Sensory/Pain 2. Cardio 3. Respiratory 4. Neuromusculoskeletal 
5. Movement Related Activities and Participation Affected: 1. General Tasks and Demands 2. Mobility 3. Self Care 4. Domestic Life 5. Interpersonal Interactions and Relationships 6. Community, Social and [place] Number of elements that affect the Plan of Care: 4+: HIGH COMPLEXITY CLINICAL PRESENTATION:  
Presentation: Evolving clinical presentation with changing clinical characteristics: MODERATE COMPLEXITY CLINICAL DECISION MAKIN \[facility]\"" [address] [address] AM-PAC 6 Clicks Basic Mobility Inpatient Short Form How much difficulty does the patient currently have. .. Unable A Lot A Little None 1.   Turning over in bed (including adjusting bedclothes, sheets and blankets)? [] 1   [x] 2   [] 3   [] 4  
2. Sitting down on and standing up from a chair with arms ( e.g., wheelchair, bedside commode, etc.)   [] 1   [] 2   [x] 3   [] 4  
3. Moving from lying on back to sitting on the side of the bed? [] 1   [x] 2   [] 3   [] 4 How much help from another person does the patient currently need. .. Total A Lot A Little None 4. Moving to and from a bed to a chair (including a wheelchair)? [] 1   [] 2   [x] 3   [] 4  
5. Need to walk in hospital room? [] 1   [x] 2   [] 3   [] 4  
6. Climbing 3-5 steps with a railing? [x] 1   [] 2   [] 3   [] 4  
© 2007, Trustees of [facility] [employer], under license to [employer]. All rights reserved Score:  Initial: 13 Most Recent: X (Date: -- ) Interpretation of Tool:  Represents activities that are increasingly more difficult (i.e. Bed mobility, Transfers, Gait). Score 24 23 22-20 19-15 14-10 9-7 6 Modifier CH CI CJ CK CL CM CN   
 
? Mobility - Walking and Moving Around:  
  - CURRENT STATUS: CL - 60%-79% impaired, limited or restricted  - GOAL STATUS: CK - 40%-59% impaired, limited or restricted  - D/C STATUS:  ---------------To be determined--------------- [place] / [place] B / Product Type: Medicare /   
 
Medical Necessity:    
· Patient is expected to demonstrate progress in strength, range of motion, balance, coordination and functional technique to decrease assistance required with gait, transfers, and functional mobility. Goldie Garrison Reason for Services/Other Comments: 
· Patient continues to require skilled intervention due to decreased strength, decreased balance, decreased functional tolerance, decreased cardiopulmonary endurance affecting participation in basic ADLs and functional tasks.   
Use of outcome tool(s) and clinical judgement create a POC that gives a: Clear prediction of patient's progress: LOW COMPLEXITY  
  
 
 
 
TREATMENT:  
 (In addition to Assessment/Re-Assessment sessions the following treatments were rendered) Pre-treatment Symptoms/Complaints:  none Pain: Initial:  
Pain Intensity 1: 2  Post Session: no Therapeutic Activity: (    15 minutes): Therapeutic activities including bed mobility training, transfer training from various surface heights, static/dynamic sitting/standing balance training, scooting, to improve mobility, strength, balance and coordination. Required minimal assist   to promote static and dynamic balance in standing. Gait Training (  10 Minutes):  Gait training to improve and/or restore physical functioning as related to mobility, strength and balance. Ambulated 8 Feet (ft) with Contact guard assistance using a Walker, rolling and minimal   related to their sequencing, weight shifts, walker manipulation, UE support on rolling walker, foot placement, and step length to promote proper body alignment, promote proper body posture and promote proper body mechanics. Instruction in performance of the above deficits to correct overall gait quality and functional mobility. Therapeutic Exercise: (15 Minutes):  Exercises per grid below to improve mobility, strength and dynamic movement of leg - right to improve functional movement. Required minimal verbal and manual cues to promote proper body mechanics. Progressed complexity of movement as indicated. Date: 
1/12/19 Date: 
 Date: Activity/Exercise Parameters Parameters Parameters Supine heel slides 10x B AAR Supine SAQ 10x B AAR Supine quad sets 10x R Supine hip abd 10x B AAR Braces/Orthotics/Lines/Etc:  
· none Treatment/Session Assessment:   
· Response to Treatment:  See above · Interdisciplinary Collaboration:  
o Physical Therapy Assistant 
o Registered Nurse · After treatment position/precautions:  
o Up in chair 
o Bed alarm/tab alert on 
o Bed/Chair-wheels locked 
o Bed in low position o Call light within reach 
o RN notified 
o Family at bedside · Compliance with Program/Exercises: Compliant all of the time · Recommendations/Intent for next treatment session: \"Next visit will focus on advancements to more challenging activities and reduction in assistance provided\". Total Treatment Duration: PT Patient Time In/Time Out Time In: 0852 Time Out: 1030 Tammy Gillis, PTA

## 2019-01-12 NOTE — PROGRESS NOTES
Problem: Pressure Injury - Risk of 
Goal: *Prevention of pressure injury Document Kevin Scale and appropriate interventions in the flowsheet. Outcome: Progressing Towards Goal 
Pressure Injury Interventions: Activity Interventions: Increase time out of bed, Pressure redistribution bed/mattress(bed type), PT/OT evaluation Mobility Interventions: HOB 30 degrees or less, Pressure redistribution bed/mattress (bed type), PT/OT evaluation Nutrition Interventions: Document food/fluid/supplement intake, Offer support with meals,snacks and hydration

## 2019-01-12 NOTE — PROGRESS NOTES
ORTH FRACTURE PROGRESS NOTE 2019 Admit Date:  
2019 Post Op day: 1 Days Post-Op Subjective:   
Ashley Rivas PATIENT DOING WELL  
 
PT/OT:  
Gait:  Gait Base of Support: Center of gravity altered, Shift to left Speed/[place]: Shuffled, Slow Step Length: Right shortened, Left shortened Stance: Right decreased Gait Abnormalities: Decreased step clearance, Step to gait, Antalgic Ambulation - Level of Assistance: Minimal assistance Distance (ft): (2' then 6') Assistive Device: Walker, rolling Interventions: Safety awareness training, Tactile cues, Verbal cues Interventions: Safety awareness training, Tactile cues, Verbal cues Vital Signs:   
Patient Vitals for the past 8 hrs: 
 BP Temp Pulse Resp SpO2  
19 0431 136/70 98.6 °F (37 °C) 77 18 95 % 19 0012 149/71 98.7 °F (37.1 °C) 86 18 91 % Temp (24hrs), Av.5 °F (36.9 °C), Min:97.8 °F (36.6 °C), Max:99.1 °F (37.3 °C) Pain Control:  
Pain Assessment Pain Scale 1: Numeric (0 - 10) Pain Intensity 1: 0 Pain Onset 1: post op Pain Location 1: Hip Pain Orientation 1: Right Pain Description 1: Aching Pain Intervention(s) 1: Medication (see MAR) Meds: 
 
Current Facility-Administered Medications Medication Dose Route Frequency  traMADol (ULTRAM) tablet 50 mg  50 mg Oral Q6H PRN  
 lactated Ringers infusion  75 mL/hr IntraVENous CONTINUOUS  
 sodium chloride (NS) flush 5-40 mL  5-40 mL IntraVENous Q8H  
 sodium chloride (NS) flush 5-40 mL  5-40 mL IntraVENous PRN  
 acetaminophen (TYLENOL) tablet 650 mg  650 mg Oral Q8H  
 oxyCODONE IR (ROXICODONE) tablet 5 mg  5 mg Oral Q4H PRN  
 ondansetron (ZOFRAN) injection 4 mg  4 mg IntraVENous Q4H PRN  
 docusate sodium (COLACE) capsule 100 mg  100 mg Oral BID  alum-mag hydroxide-simeth (MYLANTA) oral suspension 30 mL  30 mL Oral Q4H PRN  
 calcium-vitamin D (OS-AGUSTINA) 500 mg-200 unit tablet  1 Tab Oral TID WITH MEALS  
  enoxaparin (LOVENOX) injection 40 mg  40 mg SubCUTAneous Q24H  
 
 
LAB:   
Recent Labs  
  01/11/19 
0520 01/09/19 
2109 HCT 31.7* 39.0 HGB 10.1* 12.5 INR  --  1.0  
 
 
24 Hour Assessment Issues:   
Oriented Discharge Planning: SNF Transfuse PRBC's:   
 
Assessment & Physician's Comment: 
Dressing is clean, dry, and intact Neurovascular checks within normal limits Principal Problem: 
  Hip fracture ([address] (1/9/2019) Plan:  CONTINUE THERAPY WBAT 
 DC TO SNF Francheska Hale NP

## 2019-01-12 NOTE — PROGRESS NOTES
ORTH FRACTURE PROGRESS NOTE 2019 Admit Date:  
2019 Post Op day: 2 Days Post-Op Subjective:   
Nikki Cunningham PT/OT:  
Gait:  Gait Base of Support: Center of gravity altered, Shift to left Speed/[place]: Shuffled, Slow Step Length: Right shortened, Left shortened Stance: Right decreased Gait Abnormalities: Decreased step clearance, Step to gait, Antalgic Ambulation - Level of Assistance: Minimal assistance Distance (ft): (2' then 6') Assistive Device: Walker, rolling Interventions: Safety awareness training, Tactile cues, Verbal cues Interventions: Safety awareness training, Tactile cues, Verbal cues Vital Signs:   
Patient Vitals for the past 8 hrs: 
 BP Temp Pulse Resp SpO2  
19 0431 136/70 98.6 °F (37 °C) 77 18 95 % 19 0012 149/71 98.7 °F (37.1 °C) 86 18 91 % Temp (24hrs), Av.5 °F (36.9 °C), Min:97.8 °F (36.6 °C), Max:99.1 °F (37.3 °C) Pain Control:  
Pain Assessment Pain Scale 1: Numeric (0 - 10) Pain Intensity 1: 0 Pain Onset 1: post op Pain Location 1: Hip Pain Orientation 1: Right Pain Description 1: Aching Pain Intervention(s) 1: Medication (see MAR) Meds: 
 
Current Facility-Administered Medications Medication Dose Route Frequency  traMADol (ULTRAM) tablet 50 mg  50 mg Oral Q6H PRN  
 lactated Ringers infusion  75 mL/hr IntraVENous CONTINUOUS  
 sodium chloride (NS) flush 5-40 mL  5-40 mL IntraVENous Q8H  
 sodium chloride (NS) flush 5-40 mL  5-40 mL IntraVENous PRN  
 acetaminophen (TYLENOL) tablet 650 mg  650 mg Oral Q8H  
 oxyCODONE IR (ROXICODONE) tablet 5 mg  5 mg Oral Q4H PRN  
 ondansetron (ZOFRAN) injection 4 mg  4 mg IntraVENous Q4H PRN  
 docusate sodium (COLACE) capsule 100 mg  100 mg Oral BID  alum-mag hydroxide-simeth (MYLANTA) oral suspension 30 mL  30 mL Oral Q4H PRN  
 calcium-vitamin D (OS-AGUSTINA) 500 mg-200 unit tablet  1 Tab Oral TID WITH MEALS  
  enoxaparin (LOVENOX) injection 40 mg  40 mg SubCUTAneous Q24H  
 
 
LAB:   
Recent Labs  
  01/11/19 
0520 01/09/19 
2109 HCT 31.7* 39.0 HGB 10.1* 12.5 INR  --  1.0  
 
 
24 Hour Assessment Issues:   
Oriented Discharge Planning: SNF Transfuse PRBC's:   
 
Assessment & Physician's Comment: 
Neurovascular checks within normal limits Principal Problem: 
  Hip fracture ([facility] [address] (1/9/2019) Plan: 
Cont PT/OT Silverio Weaver MD

## 2019-01-13 LAB
BASOPHILS # BLD: 0 K/UL (ref 0–0.2)
BASOPHILS NFR BLD: 1 % (ref 0–2)
DIFFERENTIAL METHOD BLD: ABNORMAL
EOSINOPHIL # BLD: 0.4 K/UL (ref 0–0.8)
EOSINOPHIL NFR BLD: 5 % (ref 0.5–7.8)
ERYTHROCYTE [DISTWIDTH] IN BLOOD BY AUTOMATED COUNT: 14.3 % (ref 11.9–14.6)
HCT VFR BLD AUTO: 28.4 % (ref 35.8–46.3)
HGB BLD-MCNC: 9.1 G/DL (ref 11.7–15.4)
IMM GRANULOCYTES # BLD AUTO: 0 K/UL (ref 0–0.5)
IMM GRANULOCYTES NFR BLD AUTO: 0 % (ref 0–5)
LYMPHOCYTES # BLD: 2.2 K/UL (ref 0.5–4.6)
LYMPHOCYTES NFR BLD: 29 % (ref 13–44)
MCH RBC QN AUTO: 29.2 PG (ref 26.1–32.9)
MCHC RBC AUTO-ENTMCNC: 32 G/DL (ref 31.4–35)
MCV RBC AUTO: 91 FL (ref 79.6–97.8)
MM INDURATION POC: 0 MM (ref 0–5)
MONOCYTES # BLD: 0.7 K/UL (ref 0.1–1.3)
MONOCYTES NFR BLD: 9 % (ref 4–12)
NEUTS SEG # BLD: 4.3 K/UL (ref 1.7–8.2)
NEUTS SEG NFR BLD: 56 % (ref 43–78)
NRBC # BLD: 0 K/UL (ref 0–0.2)
PLATELET # BLD AUTO: 170 K/UL (ref 150–450)
PMV BLD AUTO: 10.8 FL (ref 9.4–12.3)
PPD POC: NEGATIVE NEGATIVE
RBC # BLD AUTO: 3.12 M/UL (ref 4.05–5.2)
WBC # BLD AUTO: 7.6 K/UL (ref 4.3–11.1)

## 2019-01-13 PROCEDURE — 97530 THERAPEUTIC ACTIVITIES: CPT

## 2019-01-13 PROCEDURE — 74011250636 HC RX REV CODE- 250/636: Performed by: NURSE PRACTITIONER

## 2019-01-13 PROCEDURE — 97110 THERAPEUTIC EXERCISES: CPT

## 2019-01-13 PROCEDURE — 99218 HC RM OBSERVATION: CPT

## 2019-01-13 PROCEDURE — 97535 SELF CARE MNGMENT TRAINING: CPT

## 2019-01-13 PROCEDURE — 36415 COLL VENOUS BLD VENIPUNCTURE: CPT

## 2019-01-13 PROCEDURE — 74011250637 HC RX REV CODE- 250/637: Performed by: NURSE PRACTITIONER

## 2019-01-13 PROCEDURE — 96372 THER/PROPH/DIAG INJ SC/IM: CPT

## 2019-01-13 PROCEDURE — 65270000029 HC RM PRIVATE

## 2019-01-13 PROCEDURE — 85025 COMPLETE CBC W/AUTO DIFF WBC: CPT

## 2019-01-13 RX ADMIN — DOCUSATE SODIUM 100 MG: 100 CAPSULE, LIQUID FILLED ORAL at 08:23

## 2019-01-13 RX ADMIN — TRAMADOL HYDROCHLORIDE 50 MG: 50 TABLET, FILM COATED ORAL at 21:31

## 2019-01-13 RX ADMIN — OXYCODONE HYDROCHLORIDE 5 MG: 5 TABLET ORAL at 08:23

## 2019-01-13 RX ADMIN — Medication 5 ML: at 05:40

## 2019-01-13 RX ADMIN — CALCIUM CARBONATE 500 MG (1,250 MG)-VITAMIN D3 200 UNIT TABLET 1 TABLET: at 13:21

## 2019-01-13 RX ADMIN — ACETAMINOPHEN 650 MG: 325 TABLET ORAL at 05:40

## 2019-01-13 RX ADMIN — DOCUSATE SODIUM 100 MG: 100 CAPSULE, LIQUID FILLED ORAL at 18:01

## 2019-01-13 RX ADMIN — Medication 5 ML: at 21:31

## 2019-01-13 RX ADMIN — CALCIUM CARBONATE 500 MG (1,250 MG)-VITAMIN D3 200 UNIT TABLET 1 TABLET: at 08:23

## 2019-01-13 RX ADMIN — ENOXAPARIN SODIUM 40 MG: 40 INJECTION SUBCUTANEOUS at 13:21

## 2019-01-13 RX ADMIN — Medication 10 ML: at 13:25

## 2019-01-13 RX ADMIN — OXYCODONE HYDROCHLORIDE 5 MG: 5 TABLET ORAL at 18:01

## 2019-01-13 RX ADMIN — CALCIUM CARBONATE 500 MG (1,250 MG)-VITAMIN D3 200 UNIT TABLET 1 TABLET: at 18:01

## 2019-01-13 RX ADMIN — ACETAMINOPHEN 650 MG: 325 TABLET ORAL at 21:30

## 2019-01-13 RX ADMIN — ACETAMINOPHEN 650 MG: 325 TABLET ORAL at 13:22

## 2019-01-13 NOTE — PROGRESS NOTES
Problem: Self Care Deficits Care Plan (Adult) Goal: *Acute Goals and Plan of Care (Insert Text) 1. Patient will complete functional transfers with supervision and adaptive equipment as needed. 2. Patient will complete lower body bathing and dressing with minimal assistance and adaptive equipment as needed. 3. Patient will complete toileting with minimal assistance. 4. Patient will participate in BUE therapeutic exercises to increase strength in BUE to 5/5 aid in functional transfers. 5. Patient will tolerate at least 20 minutes of OT treatment with no rest breaks to increase activity tolerance for ADLs. Timeframe: 7 visits OCCUPATIONAL THERAPY: Daily Note, Treatment Day: 2nd and AM  
 1/13/2019INPATIENT: Hospital Day: 5 [place] / Product Type: Medicare /  
  
NAME/AGE/GENDER: Bernard Melendez is a 66 y.o. female PRIMARY DIAGNOSIS:  Hip fracture ([facility] [address] [S72.009A] Hip fracture (HCC) Hip fracture ([facility] [address] Procedure(s) (LRB): 
RIGHT FEMUR INSERTION INTRA MEDULLARY NAIL WITH IM NAIL (Right) 3 Days Post-Op ICD-10: Treatment Diagnosis:  
 · Pain in Right Hip (M25.551) · Stiffness of Right Hip, Not elsewhere classified (M25.651) · History of falling (Z91.81) Precautions/Allergies: 
  Fall precautions Patient has no known allergies. ASSESSMENT:  
Ms. Lynsey Campuzano is a 66 y.o. female admitted after fall with R hip fracture. Pt now s/p R IM nailing and WBAT in E. Pt lives with  and reports independence with ADLs, functional mobility, and driving at baseline. Pt endorses history of falls. Pt found seated in chair and agreeable to OT evaluation and treatment upon arrival. BUE assessment revealed AROM WFL, strength 4+/5, sensation intact to light touch. Pt reports 4/10 pain at rest. Pt scooted forward in chair with SBA and completed sit to stand with [place] and additional time.   
 
1/13/2019 Pt presents in supine upon arrival. Pt transferred to sitting with mod a and scooted toward the edge of the bed with min a. Pt completed sit to stand with a rolling walker and CGA and completed functional mobility in the room with CGA. Pt was positioned in front of the sink and completed ADL's listed in the grid and left up in the chair with belongings in reach and posey on. Good effort. Continue OT POC. This section established at most recent assessment PROBLEM LIST (Impairments causing functional limitations): 1. Decreased Strength 2. Decreased ADL/Functional Activities 3. Decreased Transfer Abilities 4. Decreased Ambulation Ability/Technique 5. Decreased Balance 6. Increased Pain 7. Decreased Activity Tolerance 8. Increased Fatigue 9. Decreased Flexibility/Joint Mobility INTERVENTIONS PLANNED: (Benefits and precautions of occupational therapy have been discussed with the patient.) 1. Activities of daily living training 2. Adaptive equipment training 3. Balance training 4. Clothing management 5. Donning&doffing training 6. Hygiene training 7. Therapeutic activity 8. Therapeutic exercise TREATMENT PLAN: Frequency/Duration: Follow patient 6x/week to address above goals. Rehabilitation Potential For Stated Goals: Good RECOMMENDED REHABILITATION/EQUIPMENT: (at time of discharge pending progress): Due to the probability of continued deficits (see above) this patient will likely need continued skilled occupational therapy after discharge. Equipment: ? TBD  
    
 
 
 
OCCUPATIONAL PROFILE AND HISTORY:  
History of Present Injury/Illness (Reason for Referral): 
See H&P. Past Medical History/Comorbidities: Ms. Koffi Serrano  has no past medical history on file. Ms. Koffi Serrano  has a past surgical history that includes RIGHT FEMUR INSERTION INTRA MEDULLARY NAIL WITH IM NAIL (Right, 1/10/2019). Social History/Living Environment:  
Home Environment: Private residence # Steps to Enter: 0 One/Two Story Residence: One story Living Alone: No 
 Support Systems: Spouse/Significant Other/Partner Patient Expects to be Discharged to[de-identified] Rehabilitation facility Current DME Used/Available at Home: [employer] [place], straight, Walker, rolling, Wheelchair Tub or Shower Type: Tub/Shower combination Prior Level of Function/Work/Activity: 
Pt lives with  and reports independence with ADLs, functional mobility, and driving at baseline. Pt endorses history of falls. Dominant Side:  
      RIGHT Personal Factors:   
      Sex:  female Age:  66 y.o. Number of Personal Factors/Comorbidities that affect the Plan of Care: Brief history (0):  LOW COMPLEXITY ASSESSMENT OF OCCUPATIONAL PERFORMANCE[de-identified]  
Activities of Daily Living:  
Basic ADLs (From Assessment) Complex ADLs (From Assessment) Feeding: Independent Oral Facial Hygiene/Grooming: Setup Bathing: Moderate assistance Upper Body Dressing: Setup Lower Body Dressing: Total assistance Toileting: Maximum assistance Instrumental ADL Meal Preparation: Total assistance Homemaking: Total assistance Grooming/Bathing/Dressing Activities of Daily Living Grooming Grooming Assistance: Supervision/set up Washing Face: Supervision/set-up Washing Hands: Supervision/set-up Brushing Teeth: Supervision/set-up Brushing/Combing Hair: Supervision/set-up Upper Body Bathing Bathing Assistance: Supervision/set-up Position Performed: Seated in chair Upper Body Dressing Assistance Hospital Gown: Stand-by assistance Bed/Mat Mobility Supine to Sit: Moderate assistance Sit to Stand: Contact guard assistance Scooting: Minimum assistance Most Recent Physical Functioning:  
Gross Assessment: 
AROM: Within functional limits(BUEs) Strength: Within functional limits(BUEs) 4+/5 Coordination: Within functional limits(BUEs) Sensation: Intact(BUEs) Posture: 
  
Balance: 
Sitting: Intact Standing: Impaired Standing - Static: Constant support;Good Standing - Dynamic : Fair Bed Mobility: 
Supine to Sit: Moderate assistance Scooting: Minimum assistance Wheelchair Mobility: 
  
Transfers: 
Sit to Stand: Contact guard assistance Stand to Sit: Contact guard assistance Patient Vitals for the past 6 hrs: 
 BP BP Patient Position SpO2 Pulse 19 0753 139/65 At rest 93 % 74  
19 1140 131/72 Sitting 90 % 61 Mental Status Neurologic State: Alert Orientation Level: Oriented X4 Cognition: Follows commands Perception: Appears intact Perseveration: No perseveration noted Safety/Judgement: Awareness of environment, Fall prevention Physical Skills Involved: 1. Range of Motion 2. Balance 3. Strength 4. Activity Tolerance 5. Pain (acute) Cognitive Skills Affected (resulting in the inability to perform in a timely and safe manner): 1. None Psychosocial Skills Affected: 1. Habits/Routines 2. Environmental Adaptation 3. Self-Awareness 4. Social Roles Number of elements that affect the Plan of Care: 5+:  HIGH COMPLEXITY CLINICAL DECISION MAKIN[address] [place] [id number] AM-PAC 6 Clicks Daily Activity Inpatient Short Form How much help from another person does the patient currently need. .. Total A Lot A Little None 1. Putting on and taking off regular lower body clothing? [x] 1   [] 2   [] 3   [] 4  
2. Bathing (including washing, rinsing, drying)? [] 1   [x] 2   [] 3   [] 4  
3. Toileting, which includes using toilet, bedpan or urinal?   [] 1   [x] 2   [] 3   [] 4  
4. Putting on and taking off regular upper body clothing? [] 1   [] 2   [x] 3   [] 4  
5. Taking care of personal grooming such as brushing teeth? [] 1   [] 2   [x] 3   [] 4  
6. Eating meals? [] 1   [] 2   [] 3   [x] 4  
© , Trustees of [address] [place] [address], under license to [employer]. All rights reserved Score:  Initial: 15  2019  Most Recent: X (Date: -- ) Interpretation of Tool:  Represents activities that are increasingly more difficult (i.e. Bed mobility, Transfers, Gait). Score 24 23 22-20 19-15 14-10 9-7 6 Modifier CH CI CJ CK CL CM CN   
 
? Self Care:  
  - CURRENT STATUS: CK - 40%-59% impaired, limited or restricted  - GOAL STATUS: CJ - 20%-39% impaired, limited or restricted  - D/C STATUS:  ---------------To be determined--------------- [place] / [place] / Product Type: Medicare /   
 
Medical Necessity:    
· Patient demonstrates good rehab potential due to higher previous functional level. Reason for Services/Other Comments: 
· Patient continues to require skilled intervention due to complete ADLs at prior level of independence. Use of outcome tool(s) and clinical judgement create a POC that gives a: MODERATE COMPLEXITY  
 
 
 
TREATMENT:  
(In addition to Assessment/Re-Assessment sessions the following treatments were rendered) Pre-treatment Symptoms/Complaints:   
Pain: Initial:  
Pain Intensity 1: 0  Post Session:  same Therapeutic Activity: (15 minutes): Therapeutic activities including Bed transfers, Chair transfers and static/dynamic standing  to improve mobility, strength, balance and coordination. Required CGA to promote static and dynamic balance in standing. Self Care: (15 minutes): Procedure(s) (per grid) utilized to improve and/or restore self-care/home management as related to dressing, bathing and grooming. Required minimal verbal, tactile and   cueing to facilitate activities of daily living skills. Braces/Orthotics/Lines/Etc:  
· IV Treatment/Session Assessment:   
Response to Treatment:  Tolerated well · Interdisciplinary Collaboration:  
o Certified Occupational Therapy Assistant 
o Certified Nursing Assistant/Patient Care Technician · After treatment position/precautions:  
o Up in chair 
o Bed alarm/tab alert on 
o Bed/Chair-wheels locked o Call light within reach 
o RN notified · Compliance with Program/Exercises: Compliant all of the time. · Recommendations/Intent for next treatment session: \"Next visit will focus on advancements to more challenging activities and reduction in assistance provided\". Total Treatment Duration: OT Patient Time In/Time Out Time In: 0900 Time Out: 0930 Saul Meeks

## 2019-01-13 NOTE — PROGRESS NOTES
ORTH FRACTURE PROGRESS NOTE 2019 Admit Date:  
2019 Post Op day: 3 Days Post-Op Subjective:   
Judith Carlson PT/OT:  
Gait:  Gait Base of Support: Center of gravity altered, Shift to left Speed/[place]: Slow Step Length: Right shortened, Left shortened Stance: Right decreased Gait Abnormalities: Antalgic, Decreased step clearance, Step to gait Ambulation - Level of Assistance: Contact guard assistance Distance (ft): 8 Feet (ft) Assistive Device: Walker, rolling Interventions: Safety awareness training, Tactile cues, Verbal cues Interventions: Safety awareness training, Tactile cues, Verbal cues Vital Signs:   
Patient Vitals for the past 8 hrs: 
 BP Temp Pulse Resp SpO2  
19 0753 139/65 98.4 °F (36.9 °C) 74 18 93 % 19 0325 133/64 98.5 °F (36.9 °C) 83 18 90 % Temp (24hrs), Av.6 °F (37 °C), Min:98.2 °F (36.8 °C), Max:99.4 °F (37.4 °C) Pain Control:  
Pain Assessment Pain Scale 1: Visual 
Pain Intensity 1: 0 Pain Onset 1: post op Pain Location 1: Hip Pain Orientation 1: Right Pain Description 1: Aching Pain Intervention(s) 1: Medication (see MAR) Meds: 
 
Current Facility-Administered Medications Medication Dose Route Frequency  traMADol (ULTRAM) tablet 50 mg  50 mg Oral Q6H PRN  
 lactated Ringers infusion  75 mL/hr IntraVENous CONTINUOUS  
 sodium chloride (NS) flush 5-40 mL  5-40 mL IntraVENous Q8H  
 sodium chloride (NS) flush 5-40 mL  5-40 mL IntraVENous PRN  
 acetaminophen (TYLENOL) tablet 650 mg  650 mg Oral Q8H  
 oxyCODONE IR (ROXICODONE) tablet 5 mg  5 mg Oral Q4H PRN  
 ondansetron (ZOFRAN) injection 4 mg  4 mg IntraVENous Q4H PRN  
 docusate sodium (COLACE) capsule 100 mg  100 mg Oral BID  alum-mag hydroxide-simeth (MYLANTA) oral suspension 30 mL  30 mL Oral Q4H PRN  
 calcium-vitamin D (OS-AGUSTINA) 500 mg-200 unit tablet  1 Tab Oral TID WITH MEALS  
  enoxaparin (LOVENOX) injection 40 mg  40 mg SubCUTAneous Q24H  
 
 
LAB:   
Recent Labs  
  01/13/19 
2419 HCT 28.4* HGB 9.1*  
 
 
24 Hour Assessment Issues:   
Oriented Discharge Planning: SNF Transfuse PRBC's:   
 
Assessment & Physician's Comment: 
Neurovascular checks within normal limits Principal Problem: 
  Hip fracture ([facility] [address] (1/9/2019) Plan: 
Cont PT Barrera Valente MD

## 2019-01-13 NOTE — PROGRESS NOTES
Problem: Falls - Risk of 
Goal: *Absence of Falls Document Anne Lawson Fall Risk and appropriate interventions in the flowsheet. Outcome: Progressing Towards Goal 
Fall Risk Interventions: 
Mobility Interventions: Bed/chair exit alarm, OT consult for ADLs, Patient to call before getting OOB, PT Consult for mobility concerns Medication Interventions: Bed/chair exit alarm, Evaluate medications/consider consulting pharmacy, Patient to call before getting OOB Elimination Interventions: Call light in reach, Bed/chair exit alarm, Patient to call for help with toileting needs History of Falls Interventions: Bed/chair exit alarm, Door open when patient unattended, Investigate reason for fall Problem: Pressure Injury - Risk of 
Goal: *Prevention of pressure injury Document Kevin Scale and appropriate interventions in the flowsheet. Outcome: Progressing Towards Goal 
Pressure Injury Interventions: Activity Interventions: Increase time out of bed, Pressure redistribution bed/mattress(bed type), PT/OT evaluation Mobility Interventions: HOB 30 degrees or less, Pressure redistribution bed/mattress (bed type), PT/OT evaluation Nutrition Interventions: Document food/fluid/supplement intake, Offer support with meals,snacks and hydration

## 2019-01-13 NOTE — PROGRESS NOTES
Problem: Falls - Risk of 
Goal: *Absence of Falls Document Papitoclaire Murguia Fall Risk and appropriate interventions in the flowsheet. Outcome: Progressing Towards Goal 
Fall Risk Interventions: 
Mobility Interventions: Bed/chair exit alarm, OT consult for ADLs, Patient to call before getting OOB, PT Consult for mobility concerns Medication Interventions: Bed/chair exit alarm, Evaluate medications/consider consulting pharmacy, Patient to call before getting OOB Elimination Interventions: Call light in reach, Bed/chair exit alarm, Patient to call for help with toileting needs History of Falls Interventions: Bed/chair exit alarm, Door open when patient unattended, Investigate reason for fall Problem: Pressure Injury - Risk of 
Goal: *Prevention of pressure injury Document Kevin Scale and appropriate interventions in the flowsheet. Outcome: Progressing Towards Goal 
Pressure Injury Interventions: Activity Interventions: Increase time out of bed, Pressure redistribution bed/mattress(bed type), PT/OT evaluation Mobility Interventions: HOB 30 degrees or less, Pressure redistribution bed/mattress (bed type), PT/OT evaluation Nutrition Interventions: Document food/fluid/supplement intake, Offer support with meals,snacks and hydration

## 2019-01-13 NOTE — PROGRESS NOTES
Problem: Mobility Impaired (Adult and Pediatric) Goal: *Acute Goals and Plan of Care (Insert Text) STG: 
(1.)Ms. Stefania Sidhu will move from supine to sit and sit to supine  with MINIMAL ASSIST within 3 treatment day(s). (2.)Ms. Stefania Sidhu will transfer from bed to chair and chair to bed with CONTACT GUARD ASSIST using the least restrictive device within 3 treatment day(s). (3.)Ms. Rivas will ambulate with CONTACT GUARD ASSIST for 50 feet with the least restrictive device within 3 treatment day(s). LTG: 
(1.)Ms. Stefania Sidhu will move from supine to sit and sit to supine  in bed with STAND BY ASSIST within 7 treatment day(s). (2.)Ms. Stefania Sidhu will transfer from bed to chair and chair to bed with STAND BY ASSIST using the least restrictive device within 7 treatment day(s). (3.)Ms. Rivas will ambulate with STAND BY ASSIST for 100+ feet with the least restrictive device within 7 treatment day(s). ________________________________________________________________________________________________ PHYSICAL THERAPY: Daily Note, Treatment Day: 3rd, AM 1/13/2019INPATIENT: Hospital Day: 5 [place] / Product Type: Medicare /  
  
NAME/AGE/GENDER: Nikki Cunningham is a 66 y.o. female PRIMARY DIAGNOSIS: Hip fracture ([facility] [address] [S72.009A] Hip fracture (HCC) Hip fracture ([facility] [address] Procedure(s) (LRB): 
RIGHT FEMUR INSERTION INTRA MEDULLARY NAIL WITH IM NAIL (Right) 3 Days Post-Op ICD-10: Treatment Diagnosis:  
 · Generalized Muscle Weakness (M62.81) · Difficulty in walking, Not elsewhere classified (R26.2) · History of falling (Z91.81) Precaution/Allergies: 
Patient has no known allergies. WBAT R LE 
  
ASSESSMENT:  
Ms. Stefania Sidhu was sitting up in the chair and happy to participate. She performed seated exercises below with good ability.   She stood into the walker with CGA and was able to increase her gait distance considerably to about 40 feet outside the room and back. She continues to move very slowly but improved mobility and confidence. Working toward [employer]. This section established at most recent assessment PROBLEM LIST (Impairments causing functional limitations): 1. Decreased Strength 2. Decreased ADL/Functional Activities 3. Decreased Transfer Abilities 4. Decreased Ambulation Ability/Technique 5. Decreased Balance 6. Increased Pain 7. Decreased Activity Tolerance 8. Decreased Pacing Skills 9. Increased Fatigue 10. Increased Shortness of Breath 11. Decreased Flexibility/Joint Mobility 12. Decreased Knowledge of Precautions 13. Decreased Boyd with Home Exercise Program 
 INTERVENTIONS PLANNED: (Benefits and precautions of physical therapy have been discussed with the patient.) 1. Balance Exercise 2. Bed Mobility 3. Family Education 4. Gait Training 5. Home Exercise Program (HEP) 6. Neuromuscular Re-education/Strengthening 7. Range of Motion (ROM) 8. Therapeutic Activites 9. Therapeutic Exercise/Strengthening 10. Transfer Training TREATMENT PLAN: Frequency/Duration: twice daily for duration of hospital stay Rehabilitation Potential For Stated Goals: Good RECOMMENDED REHABILITATION/EQUIPMENT: (at time of discharge pending progress): Due to the probability of continued deficits (see above) this patient will likely need continued skilled physical therapy after discharge. Equipment:  
? None at this time HISTORY:  
History of Present Injury/Illness (Reason for Referral): S/p RIGHT FEMUR INSERTION INTRA MEDULLARY NAIL WITH IM NAIL Past Medical History/Comorbidities: Ms. Sindhu Cam  has no past medical history on file. Ms. Sindhu Cam  has a past surgical history that includes RIGHT FEMUR INSERTION INTRA MEDULLARY NAIL WITH IM NAIL (Right, 1/10/2019). Social History/Living Environment:  
Home Environment: Private residence # Steps to Enter: 0 One/Two Story Residence: One story Living Alone: No 
Support Systems: Spouse/Significant Other/Partner Patient Expects to be Discharged to[de-identified] Rehabilitation facility Current DME Used/Available at Home: [address], [place], straight, Walker, rolling, Wheelchair Tub or Shower Type: Tub/Shower combination Prior Level of Function/Work/Activity: 
Independent with mobility, 1 fall Number of Personal Factors/Comorbidities that affect the Plan of Care: 1-2: MODERATE COMPLEXITY EXAMINATION:  
Most Recent Physical Functioning:  
Gross Assessment: 
  
         
  
Posture: 
  
Balance: 
  Bed Mobility: 
  
Wheelchair Mobility: 
  
Transfers: 
Sit to Stand: Contact guard assistance Stand to Sit: Stand-by assistance Gait: 
  
Speed/[place]: Slow Gait Abnormalities: Antalgic;Decreased step clearance; Step to gait Distance (ft): 40 Feet (ft) Assistive Device: Walker, rolling Body Structures Involved: 1. Nerves 2. Heart 3. Lungs 4. Bones 5. Joints 6. Muscles 7. Ligaments Body Functions Affected: 1. Sensory/Pain 2. Cardio 3. Respiratory 4. Neuromusculoskeletal 
5. Movement Related Activities and Participation Affected: 1. General Tasks and Demands 2. Mobility 3. Self Care 4. Domestic Life 5. Interpersonal Interactions and Relationships 6. Community, Social and [place] Number of elements that affect the Plan of Care: 4+: HIGH COMPLEXITY CLINICAL PRESENTATION:  
Presentation: Evolving clinical presentation with changing clinical characteristics: MODERATE COMPLEXITY CLINICAL DECISION MAKING:  
[employer] -PAC 6 Clicks Basic Mobility Inpatient Short Form How much difficulty does the patient currently have. .. Unable A Lot A Little None 1. Turning over in bed (including adjusting bedclothes, sheets and blankets)? [] 1   [x] 2   [] 3   [] 4  
2.   Sitting down on and standing up from a chair with arms ( e.g., wheelchair, bedside commode, etc.)   [] 1   [] 2   [x] 3   [] 4  
 3. Moving from lying on back to sitting on the side of the bed? [] 1   [x] 2   [] 3   [] 4 How much help from another person does the patient currently need. .. Total A Lot A Little None 4. Moving to and from a bed to a chair (including a wheelchair)? [] 1   [] 2   [x] 3   [] 4  
5. Need to walk in hospital room? [] 1   [x] 2   [] 3   [] 4  
6. Climbing 3-5 steps with a railing? [x] 1   [] 2   [] 3   [] 4  
© 2007, Trustees of [facility] [employer], under license to [employer]. All rights reserved Score:  Initial: 13 Most Recent: X (Date: -- ) Interpretation of Tool:  Represents activities that are increasingly more difficult (i.e. Bed mobility, Transfers, Gait). Score 24 23 22-20 19-15 14-10 9-7 6 Modifier CH CI CJ CK CL CM CN   
 
? Mobility - Walking and Moving Around:  
  - CURRENT STATUS: CL - 60%-79% impaired, limited or restricted  - GOAL STATUS: CK - 40%-59% impaired, limited or restricted  - D/C STATUS:  ---------------To be determined--------------- [place] / Product Type: Medicare /   
 
Medical Necessity:    
· Patient is expected to demonstrate progress in strength, range of motion, balance, coordination and functional technique to decrease assistance required with gait, transfers, and functional mobility. Umer Rayo Reason for Services/Other Comments: 
· Patient continues to require skilled intervention due to decreased strength, decreased balance, decreased functional tolerance, decreased cardiopulmonary endurance affecting participation in basic ADLs and functional tasks. Use of outcome tool(s) and clinical judgement create a POC that gives a: Clear prediction of patient's progress: LOW COMPLEXITY  
  
 
 
 
TREATMENT:  
(In addition to Assessment/Re-Assessment sessions the following treatments were rendered) Pre-treatment Symptoms/Complaints:  none Pain: Initial:  
Pain Intensity 1: 2  Post Session: no  
 
 Therapeutic Activity: (    15 minutes): Therapeutic activities including Chair transfers and Ambulation on level ground to improve mobility, strength, balance and endurance. Required minimal   to promote static and dynamic balance in standing. Therapeutic Exercise: (10 Minutes):  Exercises per grid below to improve mobility and strength. Required minimal visual and verbal cues to promote proper body mechanics. Date: 
1/12/19 Date: 
1/13/19 Date: Activity/Exercise Parameters Parameters Parameters Supine heel slides 10x B AAR Supine SAQ 10x B AAR Supine quad sets 10x R Supine hip abd 10x B AAR Seated TKE  10x B Seated marching  10x B Seated AP  20x B Braces/Orthotics/Lines/Etc:  
· none Treatment/Session Assessment:   
· Response to Treatment:  See above · Interdisciplinary Collaboration:  
o Physical Therapy Assistant 
o Registered Nurse · After treatment position/precautions:  
o Up in chair 
o Bed alarm/tab alert on 
o Bed/Chair-wheels locked 
o Bed in low position 
o Call light within reach 
o RN notified 
o Family at bedside · Compliance with Program/Exercises: Compliant all of the time · Recommendations/Intent for next treatment session: \"Next visit will focus on advancements to more challenging activities and reduction in assistance provided\". Total Treatment Duration: PT Patient Time In/Time Out Time In: 1020 Time Out: 1045 Varun Huber PTA

## 2019-01-14 VITALS
WEIGHT: 170 LBS | TEMPERATURE: 98 F | HEIGHT: 65 IN | SYSTOLIC BLOOD PRESSURE: 175 MMHG | RESPIRATION RATE: 18 BRPM | OXYGEN SATURATION: 92 % | DIASTOLIC BLOOD PRESSURE: 69 MMHG | BODY MASS INDEX: 28.32 KG/M2 | HEART RATE: 70 BPM

## 2019-01-14 LAB — 25(OH)D3+25(OH)D2 SERPL-MCNC: 33.3 NG/ML (ref 30–100)

## 2019-01-14 PROCEDURE — 99218 HC RM OBSERVATION: CPT

## 2019-01-14 PROCEDURE — 97530 THERAPEUTIC ACTIVITIES: CPT

## 2019-01-14 PROCEDURE — 74011250636 HC RX REV CODE- 250/636: Performed by: NURSE PRACTITIONER

## 2019-01-14 PROCEDURE — 96372 THER/PROPH/DIAG INJ SC/IM: CPT

## 2019-01-14 PROCEDURE — 97535 SELF CARE MNGMENT TRAINING: CPT

## 2019-01-14 PROCEDURE — 74011250637 HC RX REV CODE- 250/637: Performed by: NURSE PRACTITIONER

## 2019-01-14 RX ORDER — ACETAMINOPHEN 325 MG/1
650 TABLET ORAL EVERY 8 HOURS
Qty: 10 TAB | Refills: 0 | Status: SHIPPED
Start: 2019-01-14

## 2019-01-14 RX ORDER — MAG HYDROX/ALUMINUM HYD/SIMETH 200-200-20
30 SUSPENSION, ORAL (FINAL DOSE FORM) ORAL
Qty: 354 ML | Refills: 0 | Status: SHIPPED
Start: 2019-01-14

## 2019-01-14 RX ORDER — ENOXAPARIN SODIUM 100 MG/ML
40 INJECTION SUBCUTANEOUS EVERY 24 HOURS
Qty: 10 SYRINGE | Refills: 0 | Status: SHIPPED
Start: 2019-01-14 | End: 2019-02-11

## 2019-01-14 RX ORDER — DOCUSATE SODIUM 100 MG/1
100 CAPSULE, LIQUID FILLED ORAL
Qty: 20 CAP | Refills: 2 | Status: SHIPPED
Start: 2019-01-14 | End: 2019-04-14

## 2019-01-14 RX ORDER — FERROUS SULFATE, DRIED 160(50) MG
1 TABLET, EXTENDED RELEASE ORAL
Qty: 10 TAB | Refills: 0 | Status: SHIPPED
Start: 2019-01-14

## 2019-01-14 RX ORDER — OXYCODONE HYDROCHLORIDE 5 MG/1
5 TABLET ORAL
Qty: 20 TAB | Refills: 0 | Status: SHIPPED | OUTPATIENT
Start: 2019-01-14

## 2019-01-14 RX ADMIN — ACETAMINOPHEN 650 MG: 325 TABLET ORAL at 05:21

## 2019-01-14 RX ADMIN — DOCUSATE SODIUM 100 MG: 100 CAPSULE, LIQUID FILLED ORAL at 08:22

## 2019-01-14 RX ADMIN — CALCIUM CARBONATE 500 MG (1,250 MG)-VITAMIN D3 200 UNIT TABLET 1 TABLET: at 12:40

## 2019-01-14 RX ADMIN — OXYCODONE HYDROCHLORIDE 5 MG: 5 TABLET ORAL at 05:21

## 2019-01-14 RX ADMIN — CALCIUM CARBONATE 500 MG (1,250 MG)-VITAMIN D3 200 UNIT TABLET 1 TABLET: at 08:22

## 2019-01-14 RX ADMIN — ENOXAPARIN SODIUM 40 MG: 40 INJECTION SUBCUTANEOUS at 12:40

## 2019-01-14 RX ADMIN — OXYCODONE HYDROCHLORIDE 5 MG: 5 TABLET ORAL at 12:40

## 2019-01-14 RX ADMIN — Medication 5 ML: at 05:21

## 2019-01-14 NOTE — PROGRESS NOTES
Confirmed pt has bed at [facility] if medically ready can DC today, spoke with  Deepthi Penny NP she will need to see pt to see if she is medically ready for DC today. CM will remain available.

## 2019-01-14 NOTE — PROGRESS NOTES
Pt medically ready for DC today pt will be going to [facility]  room 9 report [phone], pt and spouse made aware of pending transport to facility, will arrange transport with [address] [place] [address] for 1pm today.

## 2019-01-14 NOTE — PROGRESS NOTES
Problem: Mobility Impaired (Adult and Pediatric) Goal: *Acute Goals and Plan of Care (Insert Text) STG: 
(1.)Ms. Luz Da Silva will move from supine to sit and sit to supine  with MINIMAL ASSIST within 3 treatment day(s). (2.)Ms. Luz Da Silva will transfer from bed to chair and chair to bed with CONTACT GUARD ASSIST using the least restrictive device within 3 treatment day(s). GOAL MET 1/14/2019 
(3.)Ms. Rivas will ambulate with CONTACT GUARD ASSIST for 50 feet with the least restrictive device within 3 treatment day(s). GOAL MET 1/14/2019 LTG: 
(1.)Ms. Luz Da Silva will move from supine to sit and sit to supine  in bed with STAND BY ASSIST within 7 treatment day(s). (2.)Ms. Luz Da Silva will transfer from bed to chair and chair to bed with STAND BY ASSIST using the least restrictive device within 7 treatment day(s). (3.)Ms. Rivas will ambulate with STAND BY ASSIST for 100+ feet with the least restrictive device within 7 treatment day(s). ________________________________________________________________________________________________ PHYSICAL THERAPY: Daily Note, Treatment Day: 4th, AM 1/14/2019INPATIENT: Hospital Day: 6 [place] / Product Type: Medicare /  
  
NAME/AGE/GENDER: Aaliyah Gonzalez is a 66 y.o. female PRIMARY DIAGNOSIS: Hip fracture ([address] [S72.009A] Hip fracture (HCC) Hip fracture ([facility] [address] [facility] Procedure(s) (LRB): 
RIGHT FEMUR INSERTION INTRA MEDULLARY NAIL WITH IM NAIL (Right) 4 Days Post-Op ICD-10: Treatment Diagnosis:  
 · Generalized Muscle Weakness (M62.81) · Difficulty in walking, Not elsewhere classified (R26.2) · History of falling (Z91.81) Precaution/Allergies: 
Patient has no known allergies. WBAT R LE 
  
ASSESSMENT:  
Ms. Luz Da Silva was sitting up in recliner chair upon contact and agreeable to PT. Patient participates in therapeutic strengthening exercises to improve functional strength for transfers, gait and overall mobility.  Patient requires cues and assistance to perform exercises correctly. Patient then transfers to standing with CGA and increases gait distance to 60' with use of rolling walker, CGA and occasional cues for sequencing with rolling walker. Overall good progress towards physical therapy goals. Above goals in red have been met thus far. Will continue efforts. Patient would benefit from continued skilled PT prior to returning home as she is below baseline and currently at an increased risk for falls. This section established at most recent assessment PROBLEM LIST (Impairments causing functional limitations): 1. Decreased Strength 2. Decreased ADL/Functional Activities 3. Decreased Transfer Abilities 4. Decreased Ambulation Ability/Technique 5. Decreased Balance 6. Increased Pain 7. Decreased Activity Tolerance 8. Decreased Pacing Skills 9. Increased Fatigue 10. Increased Shortness of Breath 11. Decreased Flexibility/Joint Mobility 12. Decreased Knowledge of Precautions 13. Decreased [place] with Home Exercise Program 
 INTERVENTIONS PLANNED: (Benefits and precautions of physical therapy have been discussed with the patient.) 1. Balance Exercise 2. Bed Mobility 3. Family Education 4. Gait Training 5. Home Exercise Program (HEP) 6. Neuromuscular Re-education/Strengthening 7. Range of Motion (ROM) 8. Therapeutic Activites 9. Therapeutic Exercise/Strengthening 10. Transfer Training TREATMENT PLAN: Frequency/Duration: twice daily for duration of hospital stay Rehabilitation Potential For Stated Goals: Good RECOMMENDED REHABILITATION/EQUIPMENT: (at time of discharge pending progress): Due to the probability of continued deficits (see above) this patient will likely need continued skilled physical therapy after discharge. Equipment:  
? None at this time HISTORY:  
History of Present Injury/Illness (Reason for Referral): S/p RIGHT FEMUR INSERTION INTRA MEDULLARY NAIL WITH IM NAIL Past Medical History/Comorbidities: Ms. Jethro Rojas  has no past medical history on file. Ms. Jethro Rojas  has a past surgical history that includes RIGHT FEMUR INSERTION INTRA MEDULLARY NAIL WITH IM NAIL (Right, 1/10/2019). Social History/Living Environment:  
Home Environment: Private residence # Steps to Enter: 0 One/Two Story Residence: One story Living Alone: No 
Support Systems: Spouse/Significant Other/Partner Patient Expects to be Discharged to[de-identified] Rehabilitation facility Current DME Used/Available at Home: Nanine Likes, straight, Walker, rolling, Wheelchair Tub or Shower Type: Tub/Shower combination Prior Level of Function/Work/Activity: 
Independent with mobility, 1 fall Number of Personal Factors/Comorbidities that affect the Plan of Care: 1-2: MODERATE COMPLEXITY EXAMINATION:  
Most Recent Physical Functioning:  
Gross Assessment: 
  
         
  
Posture: 
  
Balance: 
Sitting: Intact Standing: Impaired Standing - Static: Constant support;Good Standing - Dynamic : Fair Bed Mobility: 
  
Wheelchair Mobility: 
  
Transfers: 
Sit to Stand: Contact guard assistance Stand to Sit: Contact guard assistance Gait: 
Right Side Weight Bearing: As tolerated Speed/[place]: Slow Step Length: Right shortened;Left shortened Gait Abnormalities: Decreased step clearance;Trunk sway increased Distance (ft): 60 Feet (ft) Assistive Device: Walker, rolling Ambulation - Level of Assistance: Contact guard assistance Interventions: Tactile cues; Safety awareness training;Verbal cues Body Structures Involved: 1. Nerves 2. Heart 3. Lungs 4. Bones 5. Joints 6. Muscles 7. Ligaments Body Functions Affected: 1. Sensory/Pain 2. Cardio 3. Respiratory 4. Neuromusculoskeletal 
5. Movement Related Activities and Participation Affected: 1. General Tasks and Demands 2. Mobility 3. Self Care 4. Domestic Life 5. Interpersonal Interactions and Relationships 6. Community, Social and [place] Number of elements that affect the Plan of Care: 4+: HIGH COMPLEXITY CLINICAL PRESENTATION:  
Presentation: Evolving clinical presentation with changing clinical characteristics: MODERATE COMPLEXITY CLINICAL DECISION MAKING:  
[facility] AM-PAC 6 Clicks Basic Mobility Inpatient Short Form How much difficulty does the patient currently have. .. Unable A Lot A Little None 1. Turning over in bed (including adjusting bedclothes, sheets and blankets)? [] 1   [x] 2   [] 3   [] 4  
2. Sitting down on and standing up from a chair with arms ( e.g., wheelchair, bedside commode, etc.)   [] 1   [] 2   [x] 3   [] 4  
3. Moving from lying on back to sitting on the side of the bed? [] 1   [x] 2   [] 3   [] 4 How much help from another person does the patient currently need. .. Total A Lot A Little None 4. Moving to and from a bed to a chair (including a wheelchair)? [] 1   [] 2   [x] 3   [] 4  
5. Need to walk in hospital room? [] 1   [x] 2   [] 3   [] 4  
6. Climbing 3-5 steps with a railing? [x] 1   [] 2   [] 3   [] 4  
© 2007, Trustees of [facility], under license to [employer]. All rights reserved Score:  Initial: 13 Most Recent: X (Date: -- ) Interpretation of Tool:  Represents activities that are increasingly more difficult (i.e. Bed mobility, Transfers, Gait). Score 24 23 22-20 19-15 14-10 9-7 6 Modifier CH CI CJ CK CL CM CN   
 
? Mobility - Walking and Moving Around:  
  - CURRENT STATUS: CL - 60%-79% impaired, limited or restricted  - GOAL STATUS: CK - 40%-59% impaired, limited or restricted  - D/C STATUS:  ---------------To be determined--------------- [place] / [place] B / Product Type: Medicare /   
 
Medical Necessity:    
· Patient is expected to demonstrate progress in strength, range of motion, balance, coordination and functional technique to decrease assistance required with gait, transfers, and functional mobility. Bryan Sheikh Reason for Services/Other Comments: 
· Patient continues to require skilled intervention due to decreased strength, decreased balance, decreased functional tolerance, decreased cardiopulmonary endurance affecting participation in basic ADLs and functional tasks. Use of outcome tool(s) and clinical judgement create a POC that gives a: Clear prediction of patient's progress: LOW COMPLEXITY  
  
 
 
 
TREATMENT:  
(In addition to Assessment/Re-Assessment sessions the following treatments were rendered) Pre-treatment Symptoms/Complaints:  none Pain: Initial:  
Pain Intensity 1: 0  Post Session: no Therapeutic Activity: (    14 minutes): Therapeutic activities including transfer training, instruction in sequencing with rolling walker, posture training, ambulation on level ground, static/dynamic standing balance training, and patient education to improve mobility, strength, balance and endurance. Required minimal Tactile cues; Safety awareness training;Verbal cues to promote static and dynamic balance in standing and promote coordination of bilateral, lower extremity(s). Therapeutic Exercise: (  10 Minutes):  Exercises per grid below to improve mobility, strength and balance. Required minimal visual, verbal, manual and tactile cues to promote proper body alignment, promote proper body posture and promote proper body mechanics. Progressed range, repetitions and complexity of movement as indicated. Date: 
1/12/19 Date: 
1/13/19 Date: 
1/14/19 Activity/Exercise Parameters Parameters Parameters Supine heel slides 10x B AAR Supine SAQ 10x B AAR Supine quad sets 10x R Supine hip abd 10x B AAR Seated TKE  10x B x15B A Seated marching  10x B x15B AA-R, A-L Seated AP  20x B x20B A Seated hip abduction   x15B AA-R, A-L Braces/Orthotics/Lines/Etc:  
· none Treatment/Session Assessment: · Response to Treatment:  See above · Interdisciplinary Collaboration:  
o Physical Therapy Assistant 
o Registered Nurse · After treatment position/precautions:  
o Up in chair 
o Bed alarm/tab alert on 
o Bed/Chair-wheels locked 
o Call light within reach 
o RN notified · Compliance with Program/Exercises: Compliant all of the time · Recommendations/Intent for next treatment session: \"Next visit will focus on advancements to more challenging activities and reduction in assistance provided\". Total Treatment Duration: PT Patient Time In/Time Out Time In: 0848 Time Out: 3417 Yaneth Villagran, PTA

## 2019-01-14 NOTE — PROGRESS NOTES
Hospitalist Progress Note Subjective:  
Daily Progress Note: 1/13/2019 11:00 PM 
 
Patient presented to ER 1/9 after she tripped and fell at [facility], landing on her right hip.  She was unable to rise after incident due to pain in right hip.  No additional injury or LOC.  Found with a comminuted intercondylar fracture of the right femur.  ER MD spoke with Dr Jazmin Veras, who agrees to see patient.  Admitted by Hospitalist, Ortho fracture with plans for OR 1/10. She is extremely healthy and takes no meds at home. 1/10:  Hgb 10.1. Underwent right femoral medullary nail insertion per Dr Jazmin Veras under SAB abd tolerated well.  Stable vitals, no nausea and well controlled pain post op. Evgeny Garcia in chair post op with PT.  
 1/11:  Postop day 1. Up in chair, doing well.  Pain controlled, eating, drinking and voiding.  Hbg: 10.1. Planning for rehab. Tanisha Mckinnon at bedside.  
 1/12:   Post op day 2. Hgb 9.3. Up in chair. Unsteady gait, otherwise doing well. Family at bedside. Passing gas 
  
1/13:  Up in chair. Doing well. Family at bedside. Eating and drinking, passing gas, voiding. Pain controlled. Hgb: 9.1 ADDITIONAL HISTORY:  Mild osteoporosis, [place] Current Facility-Administered Medications Medication Dose Route Frequency  traMADol (ULTRAM) tablet 50 mg  50 mg Oral Q6H PRN  
 lactated Ringers infusion  75 mL/hr IntraVENous CONTINUOUS  
 sodium chloride (NS) flush 5-40 mL  5-40 mL IntraVENous Q8H  
 sodium chloride (NS) flush 5-40 mL  5-40 mL IntraVENous PRN  
 acetaminophen (TYLENOL) tablet 650 mg  650 mg Oral Q8H  
 oxyCODONE IR (ROXICODONE) tablet 5 mg  5 mg Oral Q4H PRN  
 ondansetron (ZOFRAN) injection 4 mg  4 mg IntraVENous Q4H PRN  
 docusate sodium (COLACE) capsule 100 mg  100 mg Oral BID  alum-mag hydroxide-simeth (MYLANTA) oral suspension 30 mL  30 mL Oral Q4H PRN  
 calcium-vitamin D (OS-AGUSTINA) 500 mg-200 unit tablet  1 Tab Oral TID WITH MEALS  
  enoxaparin (LOVENOX) injection 40 mg  40 mg SubCUTAneous Q24H Review of Systems A comprehensive review of systems was negative except for that written in the HPI. Objective:  
 
Visit Vitals /68 (BP 1 Location: Right arm, BP Patient Position: Sitting) Pulse 69 Temp 97.4 °F (36.3 °C) Resp 18 Ht 5' 5\" (1.651 m) Wt 77.1 kg (170 lb) SpO2 91% BMI 28.29 kg/m² O2 Flow Rate (L/min): 2 l/min O2 Device: Room air Temp (24hrs), Av.5 °F (36.9 °C), Min:97.4 °F (36.3 °C), Max:99.4 °F (37.4 °C) General appearance:  Oriented and alert, cooperative,up in chair, doing well.  voiding. Head: Normocephalic, without obvious abnormality, atraumatic Throat: Lips, mucosa, and tongue normal. Teeth and gums normal 
Neck: supple, symmetrical, trachea midline, no adenopathy, no JVD Lungs: clear to auscultation bilaterally Heart: regular rate and rhythm, S1, S2 normal, no murmur, click, rub or gallop Abdomen: soft, non-tender. Bowel sounds normal. No masses,  no organomegaly.  Eating, drinking, voiding.  Passing flatus. Extremities: Dry intact dressing to right hip extremities normal, atraumatic, no cyanosis or edema Skin: Skin color, texture, turgor normal. No rashes or lesions Neurologic: Grossly normal 
  
Additional comments: Notes,orders, test results, vitals reviewed Data Review Recent Results (from the past 24 hour(s)) PLEASE READ & DOCUMENT PPD TEST IN 72 HRS Collection Time: 19  1:28 AM  
Result Value Ref Range PPD negative Negative  
 mm Induration 0 mm CBC WITH AUTOMATED DIFF Collection Time: 19  6:26 AM  
Result Value Ref Range WBC 7.6 4.3 - 11.1 K/uL  
 RBC 3.12 (L) 4.05 - 5.2 M/uL HGB 9.1 (L) 11.7 - 15.4 g/dL HCT 28.4 (L) 35.8 - 46.3 % MCV 91.0 79.6 - 97.8 FL  
 MCH 29.2 26.1 - 32.9 PG  
 MCHC 32.0 31.4 - 35.0 g/dL  
 RDW 14.3 11.9 - 14.6 % PLATELET 984 018 - 518 K/uL MPV 10.8 9.4 - 12.3 FL ABSOLUTE NRBC 0.00 0.0 - 0.2 K/uL DF AUTOMATED NEUTROPHILS 56 43 - 78 % LYMPHOCYTES 29 13 - 44 % MONOCYTES 9 4.0 - 12.0 % EOSINOPHILS 5 0.5 - 7.8 % BASOPHILS 1 0.0 - 2.0 % IMMATURE GRANULOCYTES 0 0.0 - 5.0 %  
 ABS. NEUTROPHILS 4.3 1.7 - 8.2 K/UL  
 ABS. LYMPHOCYTES 2.2 0.5 - 4.6 K/UL  
 ABS. MONOCYTES 0.7 0.1 - 1.3 K/UL  
 ABS. EOSINOPHILS 0.4 0.0 - 0.8 K/UL  
 ABS. BASOPHILS 0.0 0.0 - 0.2 K/UL  
 ABS. IMM. GRANS. 0.0 0.0 - 0.5 K/UL  
 
 RIGHT HIP:  Comminuted intercondylar fracture. The acetabulum and hip joint 
are intact. No other fractures are identified. IMPRESSION: : Minimal linear atelectasis of the right lower lobe. 
  
RIGHT FEMUR: views of the right femur demonstrate a comminuted intercondylar fracture. The femoral head and acetabulum are intact. The mid and distal femur are intact. Umer Perri IMPRESSION: Comminuted intercondylar fracture. 
  
CXR:  : Minimal linear atelectasis of the right lower lobe. 
  
Assessment/Plan:  
Mechanical fall with right hip trauma Right Hip fracture  
S/P right femoral intra medullary nail insertion 
            Ortho fracture center routine orders             IRDEG for rehab on discharge Osteopenia Hard of hearing To Rehab tomorrow or tuesday Care Plan discussed with: Patient/Family and Nurse Signed By: Marifer Beltran NP   
 January 13, 2019

## 2019-01-14 NOTE — PROGRESS NOTES
TRANSFER - OUT REPORT: 
 
Verbal report given to MESSI Garsia(name) on Bernard Melendez  being transferred to [facility](unit) for routine progression of care Report consisted of patients Situation, Background, Assessment and  
Recommendations(SBAR). Information from the following report(s) SBAR, Kardex, OR Summary, Procedure Summary, Intake/Output and MAR was reviewed with the receiving nurse. Lines:  
Peripheral IV 01/10/19 Posterior;Right Hand (Active) Site Assessment Clean, dry, & intact 1/14/2019  8:21 AM  
Phlebitis Assessment 0 1/14/2019  8:21 AM  
Infiltration Assessment 0 1/14/2019  8:21 AM  
Dressing Status Clean, dry, & intact 1/14/2019  8:21 AM  
Dressing Type Tape;Transparent 1/14/2019  8:21 AM  
Hub Color/Line Status Flushed;Patent 1/14/2019  8:21 AM  
Alcohol Cap Used No 1/10/2019  2:36 PM  
  
 
Opportunity for questions and clarification was provided. Patient transported with: 
 [employer]

## 2019-01-14 NOTE — DISCHARGE SUMMARY
Hospitalist Discharge Summary     Admit Date:  2019  8:18 PM   Name:  Spencer Burkitt   Age:  66 y.o.  :  1940   MRN:  [id number]   PCP:  Magdalena Whitaker MD  Treatment Team: Attending Provider: Abdulkadir Apodaca MD; Consulting Provider: Christina Mendez MD; Utilization Review: Maty Johns RN; Care Manager: Patel Nails RN; Charge Nurse: Gustavo De Guzman    Problem List for this Hospitalization:  Hospital Problems as of 2019 Date Reviewed: 1/10/2019          Codes Class Noted - Resolved POA    * (Principal) Hip fracture [facility]) ICD-10-CM: I28.649U  ICD-9-CM: 820.8  2019 - Present Unknown                Admission HPI from 2019:    Pt is a 66 y.o. female with no PMH who presents after fall with hip pain.     Pt experienced a ground level fall earlier today with immediate pain to R hip. She was unable to stand. She did not hit her head, no LOC.     In ED, VSS, afebrile. Imaging reveal R intercondylar fracture. Ortho planning for OR tomorrow. Hospital Course:  As noted above pt with fall onto her right hip resulting in a right intercondylar fracture. Pt went to surgery for repair on 1/10 and did well postoperatively. Pt has worked well with therapies. [facility] [id number] is the facility of choice and is now ready for d/c to [facility]. Follow up instructions and discharge meds at bottom of this note. Plan was discussed with patient and care team.  All questions answered. Patient was stable at time of discharge. 10 systems reviewed and negative except as noted in HPI. Diagnostic Imaging/Tests:   Xr Chest Sngl V    Result Date: 2019  EXAM: XR CHEST SNGL V INDICATION: fall, pre op COMPARISON: None. FINDINGS: A portable AP radiograph of the chest was obtained at 2050 hours. The patient is on a cardiac monitor. Minimal linear atelectasis of the right lower lobe.  The cardiac and mediastinal contours and pulmonary vascularity are normal.  The bones and soft tissues are grossly within normal limits. IMPRESSION: Minimal linear atelectasis of the right lower lobe. Xr Hip Rt W Or Wo Pelv 2-3 Vws    Result Date: 1/9/2019  EXAM:  XR HIP RT W OR WO PELV 2-3 VWS INDICATION:   fall, hip pain COMPARISON: None. FINDINGS: An AP view of the pelvis and a frogleg lateral view of the right hip demonstrate a comminuted intercondylar fracture. The acetabulum and hip joint are intact. No other fractures are identified. .      IMPRESSION: Comminuted intercondylar fracture. Xr Femur Rt 2 Vs    Result Date: 1/10/2019  Exam:  Right femur radiographs History:  pain, ORDER FOR C ARM, 78 years Female FT - 2:58 Comparison: Right femur radiographs yesterday Findings: Limited intraoperative spot radiographs of the right femur demonstrate intramedullary joe fixation of the right proximal femoral intratrochanteric fracture, now in approximate anatomic alignment. Normal alignment, joint spaces preserved. Normal mineralization. Visualized soft tissues otherwise unremarkable. Impression: Intraoperative fixation as above. Xr Femur Rt 2 Vs    Result Date: 1/9/2019  EXAM:  XR FEMUR RT 2 VS INDICATION:   fall COMPARISON: None. FINDINGS: 2  views of the right femur demonstrate a comminuted intercondylar fracture. The femoral head and acetabulum are intact. The mid and distal femur are intact. .       IMPRESSION: Comminuted intercondylar fracture. Echocardiogram results:  No results found for this visit on 01/09/19. All Micro Results     Procedure Component Value Units Date/Time    MSSA/MRSA SC BY PCR, NASAL SWAB [[id number]] Collected:  01/10/19 0206    Order Status:  Completed Specimen:  Swab Updated:  01/10/19 3374     Special Requests: NO SPECIAL REQUESTS        Culture result:       SA target not detected. A MRSA NEGATIVE, SA NEGATIVE test result does not preclude MRSA or SA nasal colonization.                 Labs: Results:       BMP, Mg, Phos No results for input(s): NA, K, CL, CO2, AGAP, BUN, CREA, CA, GLU, MG, PHOS in the last 72 hours. CBC Recent Labs     01/13/19  0626 01/12/19  0611   WBC 7.6 8.2   RBC 3.12* 3.12*   HGB 9.1* 9.3*   HCT 28.4* 28.7*    154   GRANS 56 68   LYMPH 29 18   EOS 5 4   MONOS 9 9   BASOS 1 1   IG 0 1   ANEU 4.3 5.6   ABL 2.2 1.5   MITCH 0.4 0.4   ABM 0.7 0.7   ABB 0.0 0.0   AIG 0.0 0.0      LFT No results for input(s): SGOT, ALT, TBIL, AP, TP, ALB, GLOB, AGRAT, GPT in the last 72 hours. Cardiac Testing No results found for: BNPP, BNP, CPK, RCK1, RCK2, RCK3, RCK4, CKMB, CKNDX, CKND1, TROPT, TROIQ   Coagulation Tests Lab Results   Component Value Date/Time    Prothrombin time 13.5 01/09/2019 09:09 PM    INR 1.0 01/09/2019 09:09 PM    aPTT 27.4 01/09/2019 09:09 PM      A1c No results found for: HBA1C, HGBE8, PYR1MXTM   Lipid Panel No results found for: CHOL, CHOLPOCT, CHOLX, CHLST, CHOLV, 706919, HDL, LDL, LDLC, DLDLP, 565413, VLDLC, VLDL, TGLX, TRIGL, TRIGP, TGLPOCT, CHHD, CHHDX   Thyroid Panel No results found for: TSH, T4, FT4, TT3, T3U, TSHEXT     Most Recent UA Lab Results   Component Value Date/Time    Color YELLOW 01/10/2019 02:06 AM    Appearance CLEAR 01/10/2019 02:06 AM    Specific gravity 1.024 (H) 01/10/2019 02:06 AM    pH (UA) 6.5 01/10/2019 02:06 AM    Protein NEGATIVE  01/10/2019 02:06 AM    Glucose NEGATIVE  01/10/2019 02:06 AM    Ketone 15 (A) 01/10/2019 02:06 AM    Bilirubin NEGATIVE  01/10/2019 02:06 AM    Blood NEGATIVE  01/10/2019 02:06 AM    Urobilinogen 0.2 01/10/2019 02:06 AM    Nitrites NEGATIVE  01/10/2019 02:06 AM    Leukocyte Esterase NEGATIVE  01/10/2019 02:06 AM        No Known Allergies  Immunization History   Administered Date(s) Administered    TB Skin Test (PPD) Intradermal 01/10/2019       All Labs from Last 24 Hrs:  No results found for this or any previous visit (from the past 24 hour(s)).     Discharge Exam:  Patient Vitals for the past 24 hrs:   Temp Pulse Resp BP SpO2   01/14/19 0732 97.6 °F (36.4 °C) 72 18 153/67 91 %   01/14/19 0423 98.5 °F (36.9 °C) 73 16 120/68 92 %   01/13/19 2351 98.5 °F (36.9 °C) 75 18 144/68 92 %   01/13/19 2048 97.4 °F (36.3 °C) 69 18 135/68 91 %   01/13/19 1546 98.1 °F (36.7 °C) 78 18 146/68 95 %     Oxygen Therapy  O2 Sat (%): 91 % (01/14/19 0732)  Pulse via Oximetry: 63 beats per minute (01/10/19 1537)  O2 Device: Room air (01/13/19 0805)  O2 Flow Rate (L/min): 2 l/min (01/11/19 1615)  No intake or output data in the 24 hours ending 01/14/19 1151      Physical exam:  General:    Well nourished. Alert. No distress. Eyes:   Normal sclera. Extraocular movements intact. ENT:  Normocephalic, atraumatic. Moist mucous membranes  CV:   Regular rate and rhythm. No murmur, rub, or gallop. Lungs:  Clear to auscultation bilaterally. No wheezing, rhonchi, or rales. Abdomen: Soft, nontender, nondistended. Bowel sounds normal.   Extremities: Warm and dry. No cyanosis or edema. R hip dressing C/D/I  Neurologic: No focal deficits  Skin:     No rashes or jaundice. Psych:  Normal mood and affect. Discharge Info:   Current Discharge Medication List      START taking these medications    Details   acetaminophen (TYLENOL) 325 mg tablet Take 2 Tabs by mouth every eight (8) hours. Qty: 10 Tab, Refills: 0      calcium-vitamin D (OYSTER SHELL) 500 mg(1,250mg) -200 unit per tablet Take 1 Tab by mouth three (3) times daily (with meals). Qty: 10 Tab, Refills: 0      enoxaparin (LOVENOX) 40 mg/0.4 mL 0.4 mL by SubCUTAneous route every twenty-four (24) hours for 28 days. Qty: 10 Syringe, Refills: 0      oxyCODONE IR (ROXICODONE) 5 mg immediate release tablet Take 1 Tab by mouth every four (4) hours as needed. Max Daily Amount: 30 mg.  Qty: 20 Tab, Refills: 0    Associated Diagnoses: Closed fracture of right hip, initial encounter ([facility])      docusate sodium (COLACE) 100 mg capsule Take 1 Cap by mouth two (2) times daily as needed for Constipation for up to 90 days.   Qty: 20 Cap, Refills: 2      alum-mag hydroxide-simeth (MYLANTA) 200-200-20 mg/5 mL susp Take 30 mL by mouth every four (4) hours as needed. Qty: 354 mL, Refills: 0               Disposition: SNF    Activity: PT/OT Eval and Treat  Diet: DIET REGULAR    Follow-up Appointments   Procedures    FOLLOW UP VISIT Appointment in: Other (Emanuel Hwang) As instructed     As instructed     Standing Status:   Standing     Number of Occurrences:   1     Order Specific Question:   Appointment in     Answer: Other (Specify)         Follow-up Information     Follow up With Specialties Details Why Contact Info    Alfonzo Ybarra MD Geriatric Medicine Call As needed [address] [address]  [address]  [phone]      Matheus Edwards MD Orthopedic Surgery On 1/24/2019 at 8:00 am [facility] [address]  [phone]            Case reviewed with supervising physician - HARVINDER Kwong MD    Time spent in patient discharge planning and coordination 35 minutes.     Signed:  KERA Capellan

## 2019-01-14 NOTE — PROGRESS NOTES
Problem: Falls - Risk of 
Goal: *Absence of Falls Document Beatriz Garcia Fall Risk and appropriate interventions in the flowsheet. Outcome: Progressing Towards Goal 
Fall Risk Interventions: 
Mobility Interventions: Bed/chair exit alarm, OT consult for ADLs, Patient to call before getting OOB, PT Consult for mobility concerns, PT Consult for assist device competence Medication Interventions: Bed/chair exit alarm, Patient to call before getting OOB, Teach patient to arise slowly Elimination Interventions: Bed/chair exit alarm, Call light in reach, Patient to call for help with toileting needs, Toileting schedule/hourly rounds History of Falls Interventions: Bed/chair exit alarm, Consult care management for discharge planning, Door open when patient unattended Problem: Pressure Injury - Risk of 
Goal: *Prevention of pressure injury Document Kevin Scale and appropriate interventions in the flowsheet. Outcome: Progressing Towards Goal 
Pressure Injury Interventions: 
Sensory Interventions: Assess changes in LOC Moisture Interventions: Absorbent underpads Activity Interventions: Increase time out of bed, Pressure redistribution bed/mattress(bed type), PT/OT evaluation Mobility Interventions: HOB 30 degrees or less, Pressure redistribution bed/mattress (bed type), PT/OT evaluation Nutrition Interventions: Document food/fluid/supplement intake Friction and Shear Interventions: HOB 30 degrees or less

## 2019-01-14 NOTE — PROGRESS NOTES
Problem: Self Care Deficits Care Plan (Adult) Goal: *Acute Goals and Plan of Care (Insert Text) 1. Patient will complete functional transfers with supervision and adaptive equipment as needed. 2. Patient will complete lower body bathing and dressing with minimal assistance and adaptive equipment as needed. 3. Patient will complete toileting with minimal assistance. 4. Patient will participate in BUE therapeutic exercises to increase strength in BUE to 5/5 aid in functional transfers. 5. Patient will tolerate at least 20 minutes of OT treatment with no rest breaks to increase activity tolerance for ADLs. Timeframe: 7 visits OCCUPATIONAL THERAPY: Daily Note, Treatment Day: 3rd and AM  
 1/14/2019INPATIENT: Hospital Day: 6 [place] / Product Type: Medicare /  
  
NAME/AGE/GENDER: Aljeandra Stafford is a 66 y.o. female PRIMARY DIAGNOSIS:  Hip fracture ([facility] [address] [S72.009A] Hip fracture (HCC) Hip fracture ([facility] [address] Procedure(s) (LRB): 
RIGHT FEMUR INSERTION INTRA MEDULLARY NAIL WITH IM NAIL (Right) 4 Days Post-Op ICD-10: Treatment Diagnosis:  
 · Pain in Right Hip (M25.551) · Stiffness of Right Hip, Not elsewhere classified (M25.651) · History of falling (Z91.81) Precautions/Allergies: 
  Fall precautions Patient has no known allergies. ASSESSMENT:  
Ms. Malick Burns is a 66 y.o. female admitted after fall with R hip fracture. Pt now s/p R IM nailing and WBAT in [facility]. Pt lives with  and reports independence with ADLs, functional mobility, and driving at baseline. Pt was sitting in chair upon arrival. Pt completed sponge bath and dressing with the assistance listed below in chart. Pt is progressing towards goals. Continue POC. This section established at most recent assessment PROBLEM LIST (Impairments causing functional limitations): 1. Decreased Strength 2. Decreased ADL/Functional Activities 3. Decreased Transfer Abilities 4. Decreased Ambulation Ability/Technique 5. Decreased Balance 6. Increased Pain 7. Decreased Activity Tolerance 8. Increased Fatigue 9. Decreased Flexibility/Joint Mobility INTERVENTIONS PLANNED: (Benefits and precautions of occupational therapy have been discussed with the patient.) 1. Activities of daily living training 2. Adaptive equipment training 3. Balance training 4. Clothing management 5. Donning&doffing training 6. Hygiene training 7. Therapeutic activity 8. Therapeutic exercise TREATMENT PLAN: Frequency/Duration: Follow patient 6x/week to address above goals. Rehabilitation Potential For Stated Goals: Good RECOMMENDED REHABILITATION/EQUIPMENT: (at time of discharge pending progress): Due to the probability of continued deficits (see above) this patient will likely need continued skilled occupational therapy after discharge. Equipment: ? TBD  
    
 
 
 
OCCUPATIONAL PROFILE AND HISTORY:  
History of Present Injury/Illness (Reason for Referral): 
See H&P. Past Medical History/Comorbidities: Ms. Jethro Rojas  has no past medical history on file. Ms. Jethro Rojas  has a past surgical history that includes RIGHT FEMUR INSERTION INTRA MEDULLARY NAIL WITH IM NAIL (Right, 1/10/2019). Social History/Living Environment:  
Home Environment: Private residence # Steps to Enter: 0 One/Two Story Residence: One story Living Alone: No 
Support Systems: Spouse/Significant Other/Partner Patient Expects to be Discharged to[de-identified] Rehabilitation facility Current DME Used/Available at Home: Nanine Likes, straight, Walker, rolling, Wheelchair Tub or Shower Type: Tub/Shower combination Prior Level of Function/Work/Activity: 
Pt lives with  and reports independence with ADLs, functional mobility, and driving at baseline. Pt endorses history of falls. Dominant Side:  
      RIGHT Personal Factors:   
      Sex:  female Age:  66 y.o. Number of Personal Factors/Comorbidities that affect the Plan of Care: Brief history (0):  LOW COMPLEXITY ASSESSMENT OF OCCUPATIONAL PERFORMANCE[de-identified]  
Activities of Daily Living:  
Basic ADLs (From Assessment) Complex ADLs (From Assessment) Feeding: Independent Oral Facial Hygiene/Grooming: Setup Bathing: Moderate assistance Upper Body Dressing: Setup Lower Body Dressing: Total assistance Toileting: Maximum assistance Instrumental ADL Meal Preparation: Total assistance Homemaking: Total assistance Grooming/Bathing/Dressing Activities of Daily Living Grooming Washing Face: Supervision/set-up Cognitive Retraining Safety/Judgement: Fall prevention Upper Body Bathing Bathing Assistance: Supervision/set-up Position Performed: Seated in chair Lower Body Bathing Bathing Assistance: Minimum assistance Perineal  : Supervision/set-up Lower Body : Minimum assistance Upper Body Dressing Assistance Dressing Assistance: Supervision/set-up Hospital Gown: Supervision/ set-up Lower Body Dressing Assistance Protective [place]garm[place]: Minimum assistance Socks: Moderate assistance Position Performed: Seated in chair Cues: Physical assistance;Verbal cues provided Bed/Mat Mobility Sit to Stand: Contact guard assistance Most Recent Physical Functioning:  
Gross Assessment: 
AROM: Within functional limits(BUEs) Strength: Within functional limits(BUEs) 4+/5 Coordination: Within functional limits(BUEs) Sensation: Intact(BUEs) Posture: 
  
Balance: 
Sitting: Intact Standing: Impaired Standing - Static: Constant support;Good Standing - Dynamic : Fair Bed Mobility: 
  
Wheelchair Mobility: 
  
Transfers: 
Sit to Stand: Contact guard assistance Stand to Sit: Contact guard assistance Patient Vitals for the past 6 hrs: 
 BP BP Patient Position SpO2 Pulse 01/14/19 0732 153/67 Sitting 91 % 72 Mental Status Neurologic State: Alert Orientation Level: Oriented X4 
 Cognition: Follows commands Perception: Tactile Perseveration: Tactile cues provided Safety/Judgement: Fall prevention Physical Skills Involved: 1. Range of Motion 2. Balance 3. Strength 4. Activity Tolerance 5. Pain (acute) Cognitive Skills Affected (resulting in the inability to perform in a timely and safe manner): 1. None Psychosocial Skills Affected: 1. Habits/Routines 2. Environmental Adaptation 3. Self-Awareness 4. Social Roles Number of elements that affect the Plan of Care: 5+:  HIGH COMPLEXITY CLINICAL DECISION MAKING:  
[facility] [employer] AM-PAC 6 Clicks Daily Activity Inpatient Short Form How much help from another person does the patient currently need. .. Total A Lot A Little None 1. Putting on and taking off regular lower body clothing? [x] 1   [] 2   [] 3   [] 4  
2. Bathing (including washing, rinsing, drying)? [] 1   [x] 2   [] 3   [] 4  
3. Toileting, which includes using toilet, bedpan or urinal?   [] 1   [x] 2   [] 3   [] 4  
4. Putting on and taking off regular upper body clothing? [] 1   [] 2   [x] 3   [] 4  
5. Taking care of personal grooming such as brushing teeth? [] 1   [] 2   [x] 3   [] 4  
6. Eating meals? [] 1   [] 2   [] 3   [x] 4  
© 2007, Trustees of [facility] [employer], under license to [employer]. All rights reserved Score:  Initial: 15  1/14/2019  Most Recent: X (Date: -- ) Interpretation of Tool:  Represents activities that are increasingly more difficult (i.e. Bed mobility, Transfers, Gait). Score 24 23 22-20 19-15 14-10 9-7 6 Modifier CH CI CJ CK CL CM CN   
 
? Self Care:  
  - CURRENT STATUS: CK - 40%-59% impaired, limited or restricted  - GOAL STATUS: CJ - 20%-39% impaired, limited or restricted  - D/C STATUS:  ---------------To be determined--------------- [place] / [place] / Product Type: Medicare /   
 
Medical Necessity: · Patient demonstrates good rehab potential due to higher previous functional level. Reason for Services/Other Comments: 
· Patient continues to require skilled intervention due to complete ADLs at prior level of independence. Use of outcome tool(s) and clinical judgement create a POC that gives a: MODERATE COMPLEXITY  
 
 
 
TREATMENT:  
(In addition to Assessment/Re-Assessment sessions the following treatments were rendered) Pre-treatment Symptoms/Complaints:   
Pain: Initial:  
Pain Intensity 1: 2 Pain Intervention(s) 1: Repositioned  Post Session:  same Self Care: (23): Procedure(s) (per grid) utilized to improve and/or restore self-care/home management as related to dressing and bathing. Required min verbal and manual cueing to facilitate activities of daily living skills. Braces/Orthotics/Lines/Etc:  
· IV Treatment/Session Assessment:   
Response to Treatment:  Tolerated well · Interdisciplinary Collaboration:  
o Certified Occupational Therapy Assistant 
o Registered Nurse · After treatment position/precautions:  
o Up in chair 
o Bed alarm/tab alert on 
o Bed/Chair-wheels locked 
o Call light within reach 
o RN notified 
o Family at bedside · Compliance with Program/Exercises: Compliant all of the time. · Recommendations/Intent for next treatment session: \"Next visit will focus on advancements to more challenging activities and reduction in assistance provided\". Total Treatment Duration: OT Patient Time In/Time Out Time In: 2462 Time Out: 2023 Olla Millin Maebelle Aschoff

## 2019-04-22 ENCOUNTER — HOSPITAL ENCOUNTER (OUTPATIENT)
Dept: ULTRASOUND IMAGING | Age: 79
Discharge: HOME | End: 2019-04-22
Attending: INTERNAL MEDICINE
Payer: MEDICARE

## 2019-04-22 DIAGNOSIS — R60.0 EDEMA OF RIGHT LOWER EXTREMITY: ICD-10-CM

## 2019-04-22 PROCEDURE — 93971 EXTREMITY STUDY: CPT

## 2019-05-22 ENCOUNTER — HOSPITAL ENCOUNTER (OUTPATIENT)
Dept: PHYSICAL THERAPY | Age: 79
Discharge: HOME | End: 2019-05-22
Payer: MEDICARE

## 2019-05-22 PROCEDURE — 97162 PT EVAL MOD COMPLEX 30 MIN: CPT

## 2019-05-22 NOTE — THERAPY EVALUATION
Kaykay Danielle  : 1940  Primary: [place] [place] [employer]age  Secondary:  [address]  [facility] [address] [facility] [address], [address] [facility]  [phone]   [phone]          OUTPATIENT PHYSICAL THERAPY:Initial Assessment and Discharge Summary 2019   ICD-10: Treatment Diagnosis:   · Other abnormalities of gait and mobility (R26.89)  · Difficulty in walking, not elsewhere classified (R26.2)  Precautions/Allergies:   Patient has no known allergies. TREATMENT PLAN:  Effective Dates: 2019 TO Today (2019). Frequency/Duration: 1 time a week for 1 Day(s) MEDICAL/REFERRING DIAGNOSIS:  Unspecified fracture of right femur, initial encounter for closed fracture [S72.91XA]   DATE OF ONSET: 2019  REFERRING PHYSICIAN: Marcie Burger MD MD Orders: Balance Master Evaluation  Return MD Appointment: TBD     INITIAL ASSESSMENT:  Ms. Gricelda Unger presents with Good balance and is safe to ambulate without assistive device for home and community ambulation. Objective measures indicate good static balance with a score of 49/56 on De Jesus Balance Scale (less than 45/56 indicates high fall risk); good dynamic balance with a score of 21/24 on Dynamic Gait Index (less then 19/24 is indicative of increased fall risk); and good balance with 0% below normal for her age group and height on [employer] Sensory Organization Test (see  for scanned reports). After discussing with patient, she verbalized and demonstrated understanding of safe ambulation without assistive device. No short or long term goals will be set at this time for a one-time evaluation. Please sign this plan of treatment if you concur. Thank you for the opportunity to serve this patient. PROBLEM LIST (Impacting functional limitations):  1. Decreased Balance INTERVENTIONS PLANNED: (Treatment may consist of any combination of the following)  1.  Balance Master Evaluation PAIN/SUBJECTIVE:   Initial: Pain Intensity 1: 0  Post Session:  0/10   HISTORY:   History of Injury/Illness (Reason for Referral):  Patient reports she fell in January and broke her right hip. She reports that she had physical therapy in the hospital and then at home for a while. She reports she has since been going to [facility]  for her outpatient therapy. She reports she has been doing some activities without her walker, but the surgeon would like to make sure her balance is good enough to go without her walker completely. She reports she is nervous about it also, but does feel she can do more. Past Medical History/Comorbidities:         Ms. Jethro Rojas reports her only major surgery was the surgery in January for her right hip. Social History/Living Environment:   Home Environment: Private residence  Living Alone: No  Support Systems: Family member(s), Friends \ neighbors  Prior Level of Function/Work/Activity:  Independent  Dominant Side:         RIGHT  Previous Treatment Approaches:          Independent  Personal Factors:          Sex:  female        Age:  78 y.o. Ambulatory/Rehab Services H2 Model Falls Risk Assessment   Risk Factors:       No Risk Factors Identified Ability to Rise from Chair:       (0)  Ability to rise in a single movement   Falls Prevention Plan:       No modifications necessary   Total: (5 or greater = High Risk): 0   ©2010 [facility] [employer]. All Rights Reserved. [facility] [place] Patent #[id number]. Federal Law prohibits the replication, distribution or use without written permission from [facility] [employer]   Current Medications:       Current Outpatient Medications:     acetaminophen (TYLENOL) 325 mg tablet, Take 2 Tabs by mouth every eight (8) hours. , Disp: 10 Tab, Rfl: 0    calcium-vitamin D (OYSTER SHELL) 500 mg(1,250mg) -200 unit per tablet, Take 1 Tab by mouth three (3) times daily (with meals). , Disp: 10 Tab, Rfl: 0    oxyCODONE IR (ROXICODONE) 5 mg immediate release tablet, Take 1 Tab by mouth every four (4) hours as needed. Max Daily Amount: 30 mg., Disp: 20 Tab, Rfl: 0    alum-mag hydroxide-simeth (MYLANTA) 200-200-20 mg/5 mL susp, Take 30 mL by mouth every four (4) hours as needed. , Disp: 354 mL, Rfl: 0   Date Last Reviewed:  5/22/2019    Number of Personal Factors/Comorbidities that affect the Plan of Care: 1-2: MODERATE COMPLEXITY   EXAMINATION:   Observation/Orthostatic Postural Assessment:          Posture Assessment: Rounded shoulders, Forward head   Functional Mobility:         Gait/Mobility:    Base of Support: Center of gravity altered  Speed/[place]: Pace decreased (<100 feet/min)  Swing Pattern: Left symmetrical, Right symmetrical  Gait Abnormalities: Antalgic  Ambulation - Level of Assistance: Independent  Assistive Device: Walker, rolling  · Interventions: Verbal cues, Safety awareness training          Transfers:     Sit to Stand: Independent  Stand to Sit: Independent  Stand Pivot Transfers: Independent  Bed to Chair: Independent  Lateral Transfers: Independent         Bed Mobility:     Rolling: Independent  Supine to Sit: Independent  Sit to Supine: Independent  Scooting: Independent       Strength:          UE STRENGTH: 4/5 shoulder flexion, 4/5 shoulder abduction, 4/5 shoulder extension, 4/5 elbow flexion, 4/5 elbow extension. LE STRENGTH:  4/5 hip flexion, 4/5 hip abduction, 4/5 hip adduction, 4/5 hip extension, 4/5 knee extension, 4/5 knee flexion, 3/5 ankle dorsiflexion, 3/5 ankle plantarflexion, 3/5 ankle inversion, 3/5 ankle eversion.   Sensation:         Intact  Postural Control & Balance:  · De Jesus Balance Scale:  49/56.   (A score less than 45/56 indicates high risk of falls)     · Dynamic Gait Index:  21/24.   (A score less than or equal to19/24 is abnormal and predictive of falls)     · [email or web address] Sensory Organization Test:  Objective findings indicate Normal use of somatosensory, Normal use of visual, & Normal use of vestibular inputs to balance. Center of gravity is shifted forward. See scanned report. Body Structures Involved:  1. Joints  2. Muscles Body Functions Affected:  1. Neuromusculoskeletal  2. Movement Related Activities and Participation Affected:  1. Mobility  2.  Self Care   Number of elements (examined above) that affect the Plan of Care: 4+: HIGH COMPLEXITY   CLINICAL PRESENTATION:   Presentation: Evolving clinical presentation with changing clinical characteristics: MODERATE COMPLEXITY   CLINICAL DECISION MAKING:   Use of outcome tool(s) and clinical judgement create a POC that gives a: Questionable prediction of patient's progress: MODERATE COMPLEXITY

## 2020-02-16 ENCOUNTER — HEALTH MAINTENANCE LETTER (OUTPATIENT)
Age: 80
End: 2020-02-16

## 2020-10-23 ENCOUNTER — TRANSCRIBE ORDER (OUTPATIENT)
Dept: SCHEDULING | Age: 80
End: 2020-10-23

## 2020-10-23 DIAGNOSIS — M85.852 OSTEOPENIA OF LEFT THIGH: Primary | ICD-10-CM

## 2020-11-29 ENCOUNTER — HEALTH MAINTENANCE LETTER (OUTPATIENT)
Age: 80
End: 2020-11-29

## 2020-12-04 ENCOUNTER — HOSPITAL ENCOUNTER (OUTPATIENT)
Dept: MAMMOGRAPHY | Age: 80
Discharge: HOME | End: 2020-12-04
Attending: INTERNAL MEDICINE
Payer: MEDICARE

## 2020-12-04 DIAGNOSIS — M85.852 OSTEOPENIA OF LEFT THIGH: ICD-10-CM

## 2020-12-04 PROCEDURE — 77080 DXA BONE DENSITY AXIAL: CPT

## 2021-04-10 ENCOUNTER — HEALTH MAINTENANCE LETTER (OUTPATIENT)
Age: 81
End: 2021-04-10

## 2021-09-19 ENCOUNTER — HEALTH MAINTENANCE LETTER (OUTPATIENT)
Age: 81
End: 2021-09-19

## 2022-03-18 PROBLEM — S72.009A HIP FRACTURE (HCC): Status: ACTIVE | Noted: 2019-01-09

## 2022-05-01 ENCOUNTER — HEALTH MAINTENANCE LETTER (OUTPATIENT)
Age: 82
End: 2022-05-01

## 2022-11-21 ENCOUNTER — HEALTH MAINTENANCE LETTER (OUTPATIENT)
Age: 82
End: 2022-11-21

## 2023-03-23 ENCOUNTER — HOSPITAL ENCOUNTER (OUTPATIENT)
Dept: MAMMOGRAPHY | Age: 83
Discharge: HOME | End: 2023-03-23
Payer: MEDICARE

## 2023-03-23 ENCOUNTER — HOSPITAL ENCOUNTER (OUTPATIENT)
Dept: MAMMOGRAPHY | Age: 83
End: 2023-03-23
Payer: MEDICARE

## 2023-03-23 DIAGNOSIS — Z12.31 ENCOUNTER FOR SCREENING MAMMOGRAM FOR MALIGNANT NEOPLASM OF BREAST: ICD-10-CM

## 2023-03-23 DIAGNOSIS — M85.852 OSTEOPENIA OF LEFT THIGH: ICD-10-CM

## 2023-03-23 PROCEDURE — 77080 DXA BONE DENSITY AXIAL: CPT

## 2023-03-23 PROCEDURE — 77067 SCR MAMMO BI INCL CAD: CPT

## 2023-06-02 ENCOUNTER — HEALTH MAINTENANCE LETTER (OUTPATIENT)
Age: 83
End: 2023-06-02

## 2023-08-18 NOTE — PROGRESS NOTES
PT note: 
 
Chart reviewed and pt scheduled to have ORIF of R hip today. Will discharge from PT at this time and wait for Ortho orders following surgery. Thanks, CODEY OrtegaT 
 DISPLAY PLAN FREE TEXT

## 2023-10-25 ENCOUNTER — APPOINTMENT (RX ONLY)
Dept: URBAN - METROPOLITAN AREA CLINIC 24 | Facility: CLINIC | Age: 83
Setting detail: DERMATOLOGY
End: 2023-10-25

## 2023-10-25 DIAGNOSIS — L82.1 OTHER SEBORRHEIC KERATOSIS: ICD-10-CM

## 2023-10-25 DIAGNOSIS — D18.0 HEMANGIOMA: ICD-10-CM

## 2023-10-25 DIAGNOSIS — L82.0 INFLAMED SEBORRHEIC KERATOSIS: ICD-10-CM

## 2023-10-25 PROBLEM — D18.01 HEMANGIOMA OF SKIN AND SUBCUTANEOUS TISSUE: Status: ACTIVE | Noted: 2023-10-25

## 2023-10-25 PROCEDURE — ? COUNSELING

## 2023-10-25 PROCEDURE — 11311 SHAVE SKIN LESION 0.6-1.0 CM: CPT

## 2023-10-25 PROCEDURE — ? SHAVE REMOVAL

## 2023-10-25 PROCEDURE — 99203 OFFICE O/P NEW LOW 30 MIN: CPT | Mod: 25

## 2023-10-25 ASSESSMENT — LOCATION SIMPLE DESCRIPTION DERM
LOCATION SIMPLE: LEFT UPPER BACK
LOCATION SIMPLE: LEFT LOWER BACK
LOCATION SIMPLE: RIGHT UPPER BACK
LOCATION SIMPLE: LEFT CHEEK
LOCATION SIMPLE: CHEST

## 2023-10-25 ASSESSMENT — LOCATION ZONE DERM
LOCATION ZONE: FACE
LOCATION ZONE: TRUNK

## 2023-10-25 ASSESSMENT — LOCATION DETAILED DESCRIPTION DERM
LOCATION DETAILED: RIGHT LATERAL SUPERIOR CHEST
LOCATION DETAILED: LEFT INFERIOR UPPER BACK
LOCATION DETAILED: LEFT SUPERIOR UPPER BACK
LOCATION DETAILED: RIGHT MEDIAL UPPER BACK
LOCATION DETAILED: LEFT INFERIOR CENTRAL MALAR CHEEK
LOCATION DETAILED: LEFT SUPERIOR LATERAL LOWER BACK

## 2023-10-25 NOTE — PROCEDURE: SHAVE REMOVAL
Medical Necessity Information: It is in your best interest to select a reason for this procedure from the list below. All of these items fulfill various CMS LCD requirements except the new and changing color options.
Medical Necessity Clause: This procedure was medically necessary because the lesion that was treated was:
Lab: [id number]
Lab Facility: [id number]
Detail Level: Detailed
Was A Bandage Applied: Yes
Size Of Lesion In Cm (Required): 0.7
X Size Of Lesion In Cm (Optional): 0
Depth Of Shave: dermis
Biopsy Method: Dermablade
Anesthesia Type: 1% lidocaine with epinephrine
Hemostasis: Drysol
Wound Care: Petrolatum
Render Path Notes In Note?: No
Consent was obtained from the patient. The risks and benefits to therapy were discussed in detail. Specifically, the risks of infection, scarring, bleeding, prolonged wound healing, incomplete removal, allergy to anesthesia, nerve injury and recurrence were addressed. Prior to the procedure, the treatment site was clearly identified and confirmed by the patient. All components of Universal Protocol/PAUSE Rule completed.
Post-Care Instructions: I reviewed with the patient in detail post-care instructions. Patient is to keep the biopsy site dry overnight, and then apply bacitracin twice daily until healed. Patient may apply hydrogen peroxide soaks to remove any crusting.
Notification Instructions: Patient will be notified of pathology results. However, patient instructed to call the office if not contacted within 2 weeks.
Billing Type: Third-Party Bill

## 2025-08-26 ENCOUNTER — TRANSCRIBE ORDERS (OUTPATIENT)
Dept: SCHEDULING | Age: 85
End: 2025-08-26

## 2025-08-26 DIAGNOSIS — Z78.0 POSTMENOPAUSAL: Primary | ICD-10-CM

## (undated) DEVICE — SUTURE MCRYL SZ 2-0 L27IN ABSRB VLT CT-1 L36MM 1/2 CIR TAPR Y339H

## (undated) DEVICE — SUTURE MCRYL SZ 0 L27IN ABSRB VLT CT-1 L36MM 1/2 CIR TAPR Y340H

## (undated) DEVICE — (D)PREP SKN CHLRAPRP APPL 26ML -- CONVERT TO ITEM 371833

## (undated) DEVICE — ROD RMR L950MM DIA2.5MM W/ EXTN BALL TIP

## (undated) DEVICE — 1010 S-DRAPE TOWEL DRAPE 10/BX: Brand: STERI-DRAPE™

## (undated) DEVICE — SURGICAL PROCEDURE PACK BASIC ST FRANCIS

## (undated) DEVICE — 3M™ TEGADERM™ TRANSPARENT FILM DRESSING FRAME STYLE, 1628, 6 IN X 8 IN (15 CM X 20 CM), 10/CT 8CT/CASE: Brand: 3M™ TEGADERM™

## (undated) DEVICE — REM POLYHESIVE ADULT PATIENT RETURN ELECTRODE: Brand: VALLEYLAB

## (undated) DEVICE — INTENDED FOR TISSUE SEPARATION, AND OTHER PROCEDURES THAT REQUIRE A SHARP SURGICAL BLADE TO PUNCTURE OR CUT.: Brand: BARD-PARKER ® STAINLESS STEEL BLADES

## (undated) DEVICE — SOLUTION IV 1000ML 0.9% SOD CHL

## (undated) DEVICE — X-LARGE COTTON GLOVE: Brand: DEROYAL

## (undated) DEVICE — (D)DRAPE ISOL ANTIMC 129X100IN -- DISC BY MFR

## (undated) DEVICE — GOWN,SIRUS,NONRNF,SETINSLV,XL,20/CS: Brand: MEDLINE

## (undated) DEVICE — 3.2MM GUIDE WIRE 400MM

## (undated) DEVICE — SLIM BODY SKIN STAPLER: Brand: APPOSE ULC

## (undated) DEVICE — OCCLUSIVE GAUZE STRIP,3% BISMUTH TRIBROMOPHENATE IN PETROLATUM BLEND: Brand: XEROFORM

## (undated) DEVICE — AMD ANTIMICROBIAL GAUZE SPONGES,12 PLY USP TYPE VII, 0.2% POLYHEXAMETHYLENE BIGUANIDE HCI (PHMB): Brand: CURITY